# Patient Record
Sex: MALE | Race: WHITE | URBAN - METROPOLITAN AREA
[De-identification: names, ages, dates, MRNs, and addresses within clinical notes are randomized per-mention and may not be internally consistent; named-entity substitution may affect disease eponyms.]

---

## 2017-02-04 ENCOUNTER — EMERGENCY (EMERGENCY)
Facility: HOSPITAL | Age: 27
LOS: 1 days | Discharge: HOME | End: 2017-02-04
Attending: EMERGENCY MEDICINE | Admitting: EMERGENCY MEDICINE
Payer: COMMERCIAL

## 2017-02-04 VITALS
OXYGEN SATURATION: 95 % | WEIGHT: 225.09 LBS | TEMPERATURE: 102 F | HEART RATE: 124 BPM | SYSTOLIC BLOOD PRESSURE: 116 MMHG | HEIGHT: 75 IN | RESPIRATION RATE: 16 BRPM | DIASTOLIC BLOOD PRESSURE: 68 MMHG

## 2017-02-04 DIAGNOSIS — J02.0 STREPTOCOCCAL PHARYNGITIS: ICD-10-CM

## 2017-02-04 DIAGNOSIS — R51 HEADACHE: ICD-10-CM

## 2017-02-04 LAB
ALBUMIN SERPL ELPH-MCNC: 4.6 G/DL — SIGNIFICANT CHANGE UP (ref 3.3–5)
ALP SERPL-CCNC: 58 U/L — SIGNIFICANT CHANGE UP (ref 40–120)
ALT FLD-CCNC: 35 U/L RC — SIGNIFICANT CHANGE UP (ref 10–45)
ANION GAP SERPL CALC-SCNC: 16 MMOL/L — SIGNIFICANT CHANGE UP (ref 5–17)
APPEARANCE UR: CLEAR — SIGNIFICANT CHANGE UP
AST SERPL-CCNC: 36 U/L — SIGNIFICANT CHANGE UP (ref 10–40)
BASOPHILS # BLD AUTO: 0 K/UL — SIGNIFICANT CHANGE UP (ref 0–0.2)
BASOPHILS NFR BLD AUTO: 0.1 % — SIGNIFICANT CHANGE UP (ref 0–2)
BILIRUB SERPL-MCNC: 0.4 MG/DL — SIGNIFICANT CHANGE UP (ref 0.2–1.2)
BILIRUB UR-MCNC: NEGATIVE — SIGNIFICANT CHANGE UP
BUN SERPL-MCNC: 15 MG/DL — SIGNIFICANT CHANGE UP (ref 7–23)
CALCIUM SERPL-MCNC: 9.6 MG/DL — SIGNIFICANT CHANGE UP (ref 8.4–10.5)
CHLORIDE SERPL-SCNC: 98 MMOL/L — SIGNIFICANT CHANGE UP (ref 96–108)
CO2 SERPL-SCNC: 24 MMOL/L — SIGNIFICANT CHANGE UP (ref 22–31)
COLOR SPEC: SIGNIFICANT CHANGE UP
CREAT SERPL-MCNC: 1.2 MG/DL — SIGNIFICANT CHANGE UP (ref 0.5–1.3)
DIFF PNL FLD: NEGATIVE — SIGNIFICANT CHANGE UP
EOSINOPHIL # BLD AUTO: 0 K/UL — SIGNIFICANT CHANGE UP (ref 0–0.5)
EOSINOPHIL NFR BLD AUTO: 0 % — SIGNIFICANT CHANGE UP (ref 0–6)
GAS PNL BLDV: SIGNIFICANT CHANGE UP
GLUCOSE SERPL-MCNC: 131 MG/DL — HIGH (ref 70–99)
GLUCOSE UR QL: NEGATIVE — SIGNIFICANT CHANGE UP
HCT VFR BLD CALC: 43.4 % — SIGNIFICANT CHANGE UP (ref 39–50)
HGB BLD-MCNC: 14.9 G/DL — SIGNIFICANT CHANGE UP (ref 13–17)
KETONES UR-MCNC: NEGATIVE — SIGNIFICANT CHANGE UP
LEUKOCYTE ESTERASE UR-ACNC: NEGATIVE — SIGNIFICANT CHANGE UP
LYMPHOCYTES # BLD AUTO: 0.8 K/UL — LOW (ref 1–3.3)
LYMPHOCYTES # BLD AUTO: 4.9 % — LOW (ref 13–44)
MCHC RBC-ENTMCNC: 31.1 PG — SIGNIFICANT CHANGE UP (ref 27–34)
MCHC RBC-ENTMCNC: 34.3 GM/DL — SIGNIFICANT CHANGE UP (ref 32–36)
MCV RBC AUTO: 90.5 FL — SIGNIFICANT CHANGE UP (ref 80–100)
MONOCYTES # BLD AUTO: 1.2 K/UL — HIGH (ref 0–0.9)
MONOCYTES NFR BLD AUTO: 7.6 % — SIGNIFICANT CHANGE UP (ref 2–14)
NEUTROPHILS # BLD AUTO: 13.8 K/UL — HIGH (ref 1.8–7.4)
NEUTROPHILS NFR BLD AUTO: 87.4 % — HIGH (ref 43–77)
NITRITE UR-MCNC: NEGATIVE — SIGNIFICANT CHANGE UP
PH UR: 6 — SIGNIFICANT CHANGE UP (ref 4.8–8)
PLATELET # BLD AUTO: 205 K/UL — SIGNIFICANT CHANGE UP (ref 150–400)
POTASSIUM SERPL-MCNC: 4.5 MMOL/L — SIGNIFICANT CHANGE UP (ref 3.5–5.3)
POTASSIUM SERPL-SCNC: 4.5 MMOL/L — SIGNIFICANT CHANGE UP (ref 3.5–5.3)
PROT SERPL-MCNC: 7.5 G/DL — SIGNIFICANT CHANGE UP (ref 6–8.3)
PROT UR-MCNC: NEGATIVE — SIGNIFICANT CHANGE UP
RAPID RVP RESULT: SIGNIFICANT CHANGE UP
RBC # BLD: 4.8 M/UL — SIGNIFICANT CHANGE UP (ref 4.2–5.8)
RBC # FLD: 11.7 % — SIGNIFICANT CHANGE UP (ref 10.3–14.5)
RBC CASTS # UR COMP ASSIST: SIGNIFICANT CHANGE UP /HPF (ref 0–2)
S PYO AG SPEC QL IA: POSITIVE
SODIUM SERPL-SCNC: 138 MMOL/L — SIGNIFICANT CHANGE UP (ref 135–145)
SP GR SPEC: 1.02 — SIGNIFICANT CHANGE UP (ref 1.01–1.02)
UROBILINOGEN FLD QL: NEGATIVE — SIGNIFICANT CHANGE UP
WBC # BLD: 15.7 K/UL — HIGH (ref 3.8–10.5)
WBC # FLD AUTO: 15.7 K/UL — HIGH (ref 3.8–10.5)
WBC UR QL: SIGNIFICANT CHANGE UP /HPF (ref 0–5)

## 2017-02-04 PROCEDURE — 99219: CPT | Mod: 25

## 2017-02-04 PROCEDURE — 93010 ELECTROCARDIOGRAM REPORT: CPT

## 2017-02-04 PROCEDURE — 71020: CPT | Mod: 26

## 2017-02-04 RX ORDER — ONDANSETRON 8 MG/1
4 TABLET, FILM COATED ORAL ONCE
Qty: 0 | Refills: 0 | Status: COMPLETED | OUTPATIENT
Start: 2017-02-04 | End: 2017-02-04

## 2017-02-04 RX ORDER — DM/PSEUDOEPHED/ACETAMINOPHEN 15-30-325
0 CAPSULE ORAL
Qty: 0 | Refills: 0 | COMMUNITY

## 2017-02-04 RX ORDER — SODIUM CHLORIDE 9 MG/ML
2000 INJECTION INTRAMUSCULAR; INTRAVENOUS; SUBCUTANEOUS ONCE
Qty: 0 | Refills: 0 | Status: COMPLETED | OUTPATIENT
Start: 2017-02-04 | End: 2017-02-04

## 2017-02-04 RX ORDER — ACETAMINOPHEN 500 MG
1000 TABLET ORAL ONCE
Qty: 0 | Refills: 0 | Status: COMPLETED | OUTPATIENT
Start: 2017-02-04 | End: 2017-02-04

## 2017-02-04 RX ORDER — KETOROLAC TROMETHAMINE 30 MG/ML
30 SYRINGE (ML) INJECTION ONCE
Qty: 0 | Refills: 0 | Status: DISCONTINUED | OUTPATIENT
Start: 2017-02-04 | End: 2017-02-04

## 2017-02-04 RX ORDER — AMOXICILLIN 250 MG/5ML
500 SUSPENSION, RECONSTITUTED, ORAL (ML) ORAL ONCE
Qty: 0 | Refills: 0 | Status: COMPLETED | OUTPATIENT
Start: 2017-02-04 | End: 2017-02-04

## 2017-02-04 RX ORDER — SODIUM CHLORIDE 9 MG/ML
1000 INJECTION INTRAMUSCULAR; INTRAVENOUS; SUBCUTANEOUS ONCE
Qty: 0 | Refills: 0 | Status: COMPLETED | OUTPATIENT
Start: 2017-02-04 | End: 2017-02-04

## 2017-02-04 RX ORDER — AMOXICILLIN 250 MG/5ML
1 SUSPENSION, RECONSTITUTED, ORAL (ML) ORAL
Qty: 20 | Refills: 0 | OUTPATIENT
Start: 2017-02-04 | End: 2017-02-14

## 2017-02-04 RX ADMIN — Medication 30 MILLIGRAM(S): at 21:52

## 2017-02-04 RX ADMIN — SODIUM CHLORIDE 2000 MILLILITER(S): 9 INJECTION INTRAMUSCULAR; INTRAVENOUS; SUBCUTANEOUS at 21:52

## 2017-02-04 RX ADMIN — Medication 500 MILLIGRAM(S): at 21:52

## 2017-02-04 RX ADMIN — Medication 400 MILLIGRAM(S): at 18:10

## 2017-02-04 RX ADMIN — SODIUM CHLORIDE 1000 MILLILITER(S): 9 INJECTION INTRAMUSCULAR; INTRAVENOUS; SUBCUTANEOUS at 18:02

## 2017-02-04 RX ADMIN — ONDANSETRON 4 MILLIGRAM(S): 8 TABLET, FILM COATED ORAL at 21:52

## 2017-02-04 NOTE — ED PROVIDER NOTE - ATTENDING CONTRIBUTION TO CARE
27 y/o m with pmhx denies presents for fever, chills, diffuse body aches, nausea/vomiting, dry cough, sore throat and headache x 1 day. Here with parents at the bedside. Denies sick contacts or known travel. Only took Dayquil at 1pm. No abdominal pain, diarrhea, rash, neck pain/stiffness. Did not get flu shot this year.  Gen. no acute distress, Non toxic   HEENT: EOMI, mmm,   Lungs: CTAB/L no C/ W /R   CVS: S1S2 tachycardia    Abd; Soft non tender, non distended   Ext: no edema   Neuro AAOx3 non focal clear speech 27 y/o m with pmhx denies presents for fever, chills, diffuse body aches, nausea/vomiting, dry cough, sore throat and headache x 1 day. Here with parents at the bedside. Denies sick contacts or known travel. Only took Dayquil at 1pm. No abdominal pain, diarrhea, rash, neck pain/stiffness. Did not get flu shot this year.  Gen. no acute distress, Non toxic   HEENT: EOMI, mmm, + tonsilar hypertrophy on right side. no exudate. no trismus no drooling no tripoding. no lymphadenopathy.   Lungs: CTAB/L no C/ W /R   CVS: S1S2 tachycardia    Abd; Soft non tender, non distended   Ext: no edema   Neuro AAOx3 non focal clear speech

## 2017-02-04 NOTE — ED PROVIDER NOTE - OBJECTIVE STATEMENT
26yM presents with fever, chills, diffuse body aches, nausea/vomiting, dry cough, sore throat and headache x 1 day. Denies sick contacts or known travel. Took Dayquil at 1pm. No abdominal pain, diarrhea, rash, neck pain/stiffness. Did not get flu shot this year.

## 2017-02-04 NOTE — ED ADULT NURSE NOTE - OBJECTIVE STATEMENT
27yo m a&ox4 ambulated into ED c/o fevers, chills, sore throat and headache at home x few days, worsening today. pt denies sick contacts, denies recent travel, denies receiving flu shot this year. pt febrile and tachycardic in ED, CODE SEPSIS called upon assessment in ED. pt denies pain with urination, denies sob, denies cp.

## 2017-02-04 NOTE — ED PROVIDER NOTE - MEDICAL DECISION MAKING DETAILS
ATTD: concern for sig infection / sepsis, r/o pna / uti, check labs, check urine, ivf, check xray chest, re eval for dispo.

## 2017-02-05 VITALS
HEART RATE: 114 BPM | OXYGEN SATURATION: 95 % | RESPIRATION RATE: 17 BRPM | DIASTOLIC BLOOD PRESSURE: 50 MMHG | TEMPERATURE: 100 F | SYSTOLIC BLOOD PRESSURE: 98 MMHG

## 2017-02-05 PROCEDURE — 85014 HEMATOCRIT: CPT

## 2017-02-05 PROCEDURE — 87581 M.PNEUMON DNA AMP PROBE: CPT

## 2017-02-05 PROCEDURE — 84295 ASSAY OF SERUM SODIUM: CPT

## 2017-02-05 PROCEDURE — 85027 COMPLETE CBC AUTOMATED: CPT

## 2017-02-05 PROCEDURE — 71046 X-RAY EXAM CHEST 2 VIEWS: CPT

## 2017-02-05 PROCEDURE — 81001 URINALYSIS AUTO W/SCOPE: CPT

## 2017-02-05 PROCEDURE — 83605 ASSAY OF LACTIC ACID: CPT

## 2017-02-05 PROCEDURE — 96374 THER/PROPH/DIAG INJ IV PUSH: CPT

## 2017-02-05 PROCEDURE — 82330 ASSAY OF CALCIUM: CPT

## 2017-02-05 PROCEDURE — 87798 DETECT AGENT NOS DNA AMP: CPT

## 2017-02-05 PROCEDURE — 82803 BLOOD GASES ANY COMBINATION: CPT

## 2017-02-05 PROCEDURE — 99284 EMERGENCY DEPT VISIT MOD MDM: CPT | Mod: 25

## 2017-02-05 PROCEDURE — 96376 TX/PRO/DX INJ SAME DRUG ADON: CPT

## 2017-02-05 PROCEDURE — 93005 ELECTROCARDIOGRAM TRACING: CPT

## 2017-02-05 PROCEDURE — 80053 COMPREHEN METABOLIC PANEL: CPT

## 2017-02-05 PROCEDURE — 82947 ASSAY GLUCOSE BLOOD QUANT: CPT

## 2017-02-05 PROCEDURE — 96375 TX/PRO/DX INJ NEW DRUG ADDON: CPT

## 2017-02-05 PROCEDURE — G0378: CPT

## 2017-02-05 PROCEDURE — 87880 STREP A ASSAY W/OPTIC: CPT

## 2017-02-05 PROCEDURE — 84132 ASSAY OF SERUM POTASSIUM: CPT

## 2017-02-05 PROCEDURE — 86308 HETEROPHILE ANTIBODY SCREEN: CPT

## 2017-02-05 PROCEDURE — 87486 CHLMYD PNEUM DNA AMP PROBE: CPT

## 2017-02-05 PROCEDURE — 87633 RESP VIRUS 12-25 TARGETS: CPT

## 2017-02-05 PROCEDURE — 82435 ASSAY OF BLOOD CHLORIDE: CPT

## 2017-02-05 PROCEDURE — 82565 ASSAY OF CREATININE: CPT

## 2017-02-05 RX ORDER — SODIUM CHLORIDE 9 MG/ML
1000 INJECTION INTRAMUSCULAR; INTRAVENOUS; SUBCUTANEOUS
Qty: 0 | Refills: 0 | Status: DISCONTINUED | OUTPATIENT
Start: 2017-02-05 | End: 2017-02-08

## 2017-02-05 RX ORDER — SODIUM CHLORIDE 9 MG/ML
1000 INJECTION INTRAMUSCULAR; INTRAVENOUS; SUBCUTANEOUS ONCE
Qty: 0 | Refills: 0 | Status: COMPLETED | OUTPATIENT
Start: 2017-02-05 | End: 2017-02-05

## 2017-02-05 RX ORDER — DEXAMETHASONE 0.5 MG/5ML
5 ELIXIR ORAL ONCE
Qty: 0 | Refills: 0 | Status: COMPLETED | OUTPATIENT
Start: 2017-02-05 | End: 2017-02-05

## 2017-02-05 RX ORDER — ACETAMINOPHEN 500 MG
1000 TABLET ORAL ONCE
Qty: 0 | Refills: 0 | Status: COMPLETED | OUTPATIENT
Start: 2017-02-05 | End: 2017-02-05

## 2017-02-05 RX ORDER — KETOROLAC TROMETHAMINE 30 MG/ML
30 SYRINGE (ML) INJECTION ONCE
Qty: 0 | Refills: 0 | Status: DISCONTINUED | OUTPATIENT
Start: 2017-02-05 | End: 2017-02-05

## 2017-02-05 RX ORDER — AMOXICILLIN 250 MG/5ML
500 SUSPENSION, RECONSTITUTED, ORAL (ML) ORAL EVERY 12 HOURS
Qty: 0 | Refills: 0 | Status: DISCONTINUED | OUTPATIENT
Start: 2017-02-05 | End: 2017-02-08

## 2017-02-05 RX ADMIN — SODIUM CHLORIDE 2000 MILLILITER(S): 9 INJECTION INTRAMUSCULAR; INTRAVENOUS; SUBCUTANEOUS at 09:24

## 2017-02-05 RX ADMIN — Medication 400 MILLIGRAM(S): at 02:12

## 2017-02-05 RX ADMIN — Medication 30 MILLIGRAM(S): at 07:27

## 2017-02-05 RX ADMIN — Medication 30 MILLIGRAM(S): at 01:45

## 2017-02-05 RX ADMIN — SODIUM CHLORIDE 150 MILLILITER(S): 9 INJECTION INTRAMUSCULAR; INTRAVENOUS; SUBCUTANEOUS at 02:12

## 2017-02-05 RX ADMIN — Medication 5 MILLIGRAM(S): at 07:27

## 2017-02-05 RX ADMIN — SODIUM CHLORIDE 150 MILLILITER(S): 9 INJECTION INTRAMUSCULAR; INTRAVENOUS; SUBCUTANEOUS at 09:26

## 2017-02-05 RX ADMIN — Medication 1000 MILLIGRAM(S): at 05:03

## 2017-02-05 RX ADMIN — Medication 500 MILLIGRAM(S): at 09:24

## 2017-02-05 NOTE — ED CDU PROVIDER NOTE - PLAN OF CARE
1. Stay well hydrated. Take Motrin 600mg every 8 hours or Tylenol 650mg every 6 hours as needed for pain/fever, take with food. Warm salt water gargles 3-4xday in the day. Soft diet. Take Amoxicillin 500mg twice daily for 10 days.   2. Follow up with your primary care provider Dr. Alvarado in 24-48 hours.   3. Return to ED for persistent high fever, vomiting, unable to swallow, neck pain or any other concerns. 1. Stay well hydrated. Take Motrin 600mg every 8 hours or Tylenol 650mg every 6 hours as needed for pain/fever, take with food. Warm salt water gargles 3-4xday. Soft diet. Take Amoxicillin 500mg twice daily for 10 days.   2. Follow up with your primary care provider Dr. Alvarado in 24-48 hours.   3. Return to ED for persistent high fever, vomiting, unable to swallow, neck pain, difficulty breathing or any other concerns.

## 2017-02-05 NOTE — ED CDU PROVIDER NOTE - DETAILS
Strep Pharyngitis/Tachycardia  - IVF  - Pain/fever control  - PO challenge  - ana laura peguero  Case d/w Dr. Pascual

## 2017-02-05 NOTE — ED CDU PROVIDER NOTE - ATTENDING CONTRIBUTION TO CARE
25 y/o m with pmhx denies presents for fever, chills, diffuse body aches, nausea/vomiting, dry cough, sore throat and headache x 1 day. Here with parents at the bedside. Denies sick contacts or known travel. Only took Dayquil at 1pm. No abdominal pain, diarrhea, rash, neck pain/stiffness. Did not get flu shot this year.  Gen. no acute distress, Non toxic   HEENT: EOMI, mmm, uvula midline, no exudate, + tonsilar hypertrophy on right side. patent airway. no lymphadenopathy.  no trismus no drooling  Lungs: CTAB/L no C/ W /R   CVS: S1S2 tachycardia    Abd; Soft non tender, non distended   Ext: no edema   Neuro AAOx3 non focal clear speech

## 2017-02-05 NOTE — ED CDU PROVIDER NOTE - OBJECTIVE STATEMENT
26yM no PMH presents with fever, chills, diffuse body aches, nausea/vomiting, dry cough, sore throat and headache x 1 day. Denies sick contacts or known travel. Took Dayquil at 1pm. No abdominal pain, diarrhea, rash, neck pain/stiffness. Did not get flu shot this year.  In ED pt code sepsis - tachy to 124 and febrile to 102.8F. Rapid strep positive, given amox 500mg PO. Pt persistently tachy after 3L IVF. Will admit to CDU for IVF, pain/fever control, PO chal, and re-eval.    PCP Nitin Alvarado

## 2017-02-05 NOTE — ED ADULT NURSE REASSESSMENT NOTE - NS ED NURSE REASSESS COMMENT FT1
Pt being transferred to CDU for fluids and observation.
Pt resting, eating and drinking, dispo pending PO challenge and revitals.
Received pt from [facility]  from ER , received pt alert and responsive, oriented x4, denies any respiratory distress, SOB, or difficulty breathing. Pt transferred to CDU for observation for fever/chills , IV in place, patent and free of signs of infiltration,  pt c/o chills and general body aches. Temperature 100.2, HR episodes of tachycardia. Tylenol IV and Fluids currently infusing. Awaiting Antibiotic treatment as ordered. Pt educated on unit and unit rules, instructed patient to notify RN of any needed assistance, Pt verbalizes understanding, Call bell placed within reach. Safety maintained. Will continue to monitor.

## 2017-02-05 NOTE — ED CDU PROVIDER NOTE - PROGRESS NOTE DETAILS
Patient resting in bed comfortably. No distress, no complaints. Vital Signs Stable.  -Estela Negrete PA-C Pt feeling better, still with sore throat.  Fever improved after Toradol.  Will plan for PO challenge and d/c home follow up ENT.  BS ED attending Dr Shawn Mccullough note:  Patient re-evaluated and doing well.  No acute issues at  this time.  Lab and radiology tests reviewed with patient and/or family.  Patient stable for discharge.  I have personally performed a face to face diagnostic evaluation on this patient.  I have reviewed the ACP note and agree with the history, exam, and plan of care, except as noted.  History and Exam by me show  tonsillar exudates injection, midline, vss ready for d.c home with course of abx, ent follow up.

## 2017-02-06 LAB — HETEROPH AB TITR SER AGGL: NEGATIVE — SIGNIFICANT CHANGE UP

## 2017-02-21 ENCOUNTER — APPOINTMENT (OUTPATIENT)
Dept: OTOLARYNGOLOGY | Facility: CLINIC | Age: 27
End: 2017-02-21

## 2017-02-28 DIAGNOSIS — Z82.49 FAMILY HISTORY OF ISCHEMIC HEART DISEASE AND OTHER DISEASES OF THE CIRCULATORY SYSTEM: ICD-10-CM

## 2017-02-28 DIAGNOSIS — Z78.9 OTHER SPECIFIED HEALTH STATUS: ICD-10-CM

## 2017-02-28 RX ORDER — AMOXICILLIN AND CLAVULANATE POTASSIUM 875; 125 MG/1; MG/1
875-125 TABLET, COATED ORAL
Qty: 20 | Refills: 0 | Status: ACTIVE | COMMUNITY
Start: 2017-02-10

## 2017-02-28 RX ORDER — AMOXICILLIN 500 MG/1
500 CAPSULE ORAL
Qty: 20 | Refills: 0 | Status: ACTIVE | COMMUNITY
Start: 2017-02-04

## 2017-02-28 RX ORDER — OMEPRAZOLE 40 MG/1
40 CAPSULE, DELAYED RELEASE ORAL
Qty: 30 | Refills: 0 | Status: ACTIVE | COMMUNITY
Start: 2017-01-05

## 2017-02-28 RX ORDER — METHYLPREDNISOLONE 4 MG/1
4 TABLET ORAL
Qty: 21 | Refills: 0 | Status: ACTIVE | COMMUNITY
Start: 2017-02-05

## 2017-03-01 ENCOUNTER — RESULT REVIEW (OUTPATIENT)
Age: 27
End: 2017-03-01

## 2017-03-01 RX ORDER — METHYLPREDNISOLONE 4 MG/1
4 TABLET ORAL
Qty: 1 | Refills: 0 | Status: ACTIVE | COMMUNITY
Start: 2017-03-01 | End: 1900-01-01

## 2017-03-01 RX ORDER — LIDOCAINE HYDROCHLORIDE 20 MG/ML
2 SOLUTION OROPHARYNGEAL
Qty: 1 | Refills: 3 | Status: ACTIVE | COMMUNITY
Start: 2017-03-01 | End: 1900-01-01

## 2017-03-01 RX ORDER — DOCUSATE SODIUM 100 MG/1
100 CAPSULE ORAL
Qty: 1 | Refills: 0 | Status: ACTIVE | COMMUNITY
Start: 2017-03-01 | End: 1900-01-01

## 2017-03-02 ENCOUNTER — OUTPATIENT (OUTPATIENT)
Dept: OUTPATIENT SERVICES | Facility: HOSPITAL | Age: 27
LOS: 1 days | Discharge: HOME | End: 2017-03-02
Payer: COMMERCIAL

## 2017-03-02 ENCOUNTER — APPOINTMENT (OUTPATIENT)
Dept: OTOLARYNGOLOGY | Facility: AMBULATORY SURGERY CENTER | Age: 27
End: 2017-03-02

## 2017-03-02 PROCEDURE — 42870 EXCISION OF LINGUAL TONSIL: CPT

## 2017-03-04 LAB — SURGICAL PATHOLOGY STUDY: SIGNIFICANT CHANGE UP

## 2017-03-06 DIAGNOSIS — K21.9 GASTRO-ESOPHAGEAL REFLUX DISEASE WITHOUT ESOPHAGITIS: ICD-10-CM

## 2017-03-06 DIAGNOSIS — J35.01 CHRONIC TONSILLITIS: ICD-10-CM

## 2017-03-09 ENCOUNTER — APPOINTMENT (OUTPATIENT)
Dept: OTOLARYNGOLOGY | Facility: CLINIC | Age: 27
End: 2017-03-09

## 2017-03-09 VITALS
TEMPERATURE: 98.6 F | DIASTOLIC BLOOD PRESSURE: 83 MMHG | OXYGEN SATURATION: 97 % | SYSTOLIC BLOOD PRESSURE: 119 MMHG | HEART RATE: 91 BPM

## 2017-03-09 DIAGNOSIS — J02.0 STREPTOCOCCAL PHARYNGITIS: ICD-10-CM

## 2017-03-09 DIAGNOSIS — Z87.09 PERSONAL HISTORY OF OTHER DISEASES OF THE RESPIRATORY SYSTEM: ICD-10-CM

## 2017-03-09 RX ORDER — AMOXICILLIN 500 MG/1
500 TABLET, FILM COATED ORAL
Qty: 14 | Refills: 0 | Status: COMPLETED | COMMUNITY
Start: 2017-03-01 | End: 2017-03-09

## 2017-03-09 RX ORDER — ACETAMINOPHEN AND CODEINE 300; 30 MG/1; MG/1
300-30 TABLET ORAL
Qty: 30 | Refills: 0 | Status: COMPLETED | COMMUNITY
Start: 2017-03-01 | End: 2017-03-09

## 2017-06-07 ENCOUNTER — TRANSCRIPTION ENCOUNTER (OUTPATIENT)
Age: 27
End: 2017-06-07

## 2018-11-14 ENCOUNTER — EMERGENCY (EMERGENCY)
Facility: HOSPITAL | Age: 28
LOS: 1 days | Discharge: HOME | End: 2018-11-14
Attending: EMERGENCY MEDICINE
Payer: COMMERCIAL

## 2018-11-14 VITALS
OXYGEN SATURATION: 98 % | RESPIRATION RATE: 20 BRPM | WEIGHT: 259.93 LBS | TEMPERATURE: 98 F | HEART RATE: 92 BPM | HEIGHT: 75 IN | SYSTOLIC BLOOD PRESSURE: 124 MMHG | DIASTOLIC BLOOD PRESSURE: 86 MMHG

## 2018-11-14 VITALS
TEMPERATURE: 98 F | OXYGEN SATURATION: 98 % | SYSTOLIC BLOOD PRESSURE: 128 MMHG | HEART RATE: 72 BPM | DIASTOLIC BLOOD PRESSURE: 64 MMHG | RESPIRATION RATE: 18 BRPM

## 2018-11-14 LAB
BASOPHILS # BLD AUTO: 0 K/UL — SIGNIFICANT CHANGE UP (ref 0–0.2)
BASOPHILS NFR BLD AUTO: 1 % — SIGNIFICANT CHANGE UP (ref 0–2)
EOSINOPHIL # BLD AUTO: 0.2 K/UL — SIGNIFICANT CHANGE UP (ref 0–0.5)
EOSINOPHIL NFR BLD AUTO: 3 % — SIGNIFICANT CHANGE UP (ref 0–6)
HCT VFR BLD CALC: 48.9 % — SIGNIFICANT CHANGE UP (ref 39–50)
HGB BLD-MCNC: 16 G/DL — SIGNIFICANT CHANGE UP (ref 13–17)
LYMPHOCYTES # BLD AUTO: 1.9 K/UL — SIGNIFICANT CHANGE UP (ref 1–3.3)
LYMPHOCYTES # BLD AUTO: 26 % — SIGNIFICANT CHANGE UP (ref 13–44)
MAGNESIUM SERPL-MCNC: 2.1 MG/DL — SIGNIFICANT CHANGE UP (ref 1.6–2.6)
MCHC RBC-ENTMCNC: 29.1 PG — SIGNIFICANT CHANGE UP (ref 27–34)
MCHC RBC-ENTMCNC: 32.8 GM/DL — SIGNIFICANT CHANGE UP (ref 32–36)
MCV RBC AUTO: 88.6 FL — SIGNIFICANT CHANGE UP (ref 80–100)
MONOCYTES # BLD AUTO: 0.9 K/UL — SIGNIFICANT CHANGE UP (ref 0–0.9)
MONOCYTES NFR BLD AUTO: 17 % — HIGH (ref 2–14)
NEUTROPHILS # BLD AUTO: 2 K/UL — SIGNIFICANT CHANGE UP (ref 1.8–7.4)
NEUTROPHILS NFR BLD AUTO: 41 % — LOW (ref 43–77)
NEUTS BAND # BLD: 4 % — SIGNIFICANT CHANGE UP (ref 0–8)
PHOSPHATE SERPL-MCNC: 3.6 MG/DL — SIGNIFICANT CHANGE UP (ref 2.5–4.5)
PLAT MORPH BLD: NORMAL — SIGNIFICANT CHANGE UP
PLATELET # BLD AUTO: 238 K/UL — SIGNIFICANT CHANGE UP (ref 150–400)
RBC # BLD: 5.51 M/UL — SIGNIFICANT CHANGE UP (ref 4.2–5.8)
RBC # FLD: 11.3 % — SIGNIFICANT CHANGE UP (ref 10.3–14.5)
RBC BLD AUTO: NORMAL — SIGNIFICANT CHANGE UP
VARIANT LYMPHS # BLD: 8 % — HIGH (ref 0–6)
WBC # BLD: 4.9 K/UL — SIGNIFICANT CHANGE UP (ref 3.8–10.5)
WBC # FLD AUTO: 4.9 K/UL — SIGNIFICANT CHANGE UP (ref 3.8–10.5)

## 2018-11-14 PROCEDURE — 85027 COMPLETE CBC AUTOMATED: CPT

## 2018-11-14 PROCEDURE — 83735 ASSAY OF MAGNESIUM: CPT

## 2018-11-14 PROCEDURE — 84100 ASSAY OF PHOSPHORUS: CPT

## 2018-11-14 PROCEDURE — 36415 COLL VENOUS BLD VENIPUNCTURE: CPT

## 2018-11-14 PROCEDURE — 99283 EMERGENCY DEPT VISIT LOW MDM: CPT | Mod: 25

## 2018-11-14 PROCEDURE — 99284 EMERGENCY DEPT VISIT MOD MDM: CPT

## 2018-11-14 RX ORDER — MECLIZINE HCL 12.5 MG
25 TABLET ORAL ONCE
Qty: 0 | Refills: 0 | Status: COMPLETED | OUTPATIENT
Start: 2018-11-14 | End: 2018-11-14

## 2018-11-14 RX ORDER — SODIUM CHLORIDE 9 MG/ML
1000 INJECTION INTRAMUSCULAR; INTRAVENOUS; SUBCUTANEOUS ONCE
Qty: 0 | Refills: 0 | Status: COMPLETED | OUTPATIENT
Start: 2018-11-14 | End: 2018-11-14

## 2018-11-14 RX ORDER — ACETAMINOPHEN 500 MG
975 TABLET ORAL ONCE
Qty: 0 | Refills: 0 | Status: DISCONTINUED | OUTPATIENT
Start: 2018-11-14 | End: 2018-11-18

## 2018-11-14 RX ORDER — MECLIZINE HCL 12.5 MG
1 TABLET ORAL
Qty: 15 | Refills: 0 | OUTPATIENT
Start: 2018-11-14 | End: 2018-11-18

## 2018-11-14 RX ADMIN — SODIUM CHLORIDE 2000 MILLILITER(S): 9 INJECTION INTRAMUSCULAR; INTRAVENOUS; SUBCUTANEOUS at 09:11

## 2018-11-14 RX ADMIN — Medication 25 MILLIGRAM(S): at 09:11

## 2018-11-14 NOTE — ED PROVIDER NOTE - PHYSICAL EXAMINATION
CONSTITUTIONAL: Patient is awake, alert and oriented x 3. Patient is well appearing and in no acute distress.  HEAD: NCAT,   EYES: PERRL b/l, EOMI, No horizontal or vertical nystagmus   ENT: Airway patent, Nasal mucosa clear. Mouth with normal mucosa. Throat has no vesicles, no oropharyngeal exudates and uvula is midline.  NECK: supple, No LAD,  LUNGS: CTA B/L,  HEART: RRR.+S1S2 no murmurs,   ABDOMEN: Soft nd/nt+bs no rebound or guarding.   EXTREMITY: no edema or calf tenderness b/l, FROM upper and lower ext b/l  SKIN: with no rash or lesions.   NEURO: Cn3-12 grossly intact. Strength5/5UE/LE.NmlSensation.Gait normal.

## 2018-11-14 NOTE — ED ADULT NURSE NOTE - NSIMPLEMENTINTERV_GEN_ALL_ED
Implemented All Universal Safety Interventions:  [place] to call system. Call bell, personal items and telephone within reach. Instruct patient to call for assistance. Room bathroom lighting operational. Non-slip footwear when patient is off stretcher. Physically safe environment: no spills, clutter or unnecessary equipment. Stretcher in lowest position, wheels locked, appropriate side rails in place.

## 2018-11-14 NOTE — ED ADULT NURSE NOTE - FAMILY HISTORY
Father  Still living? Yes, Estimated age: Age Unknown  Family history of essential hypertension, Age at diagnosis: Age Unknown  Family history of kidney stones, Age at diagnosis: Age Unknown

## 2018-11-14 NOTE — ED ADULT NURSE NOTE - OBJECTIVE STATEMENT
27 year old male presents to the ED ambulatory through waiting room with parents complaining of dizziness x 1 month. No significant PMH. Patient reports daily intermittent sensation of room spinning which is worsened with laying down. States he has also experienced intermittent ha, nausea and ear pressure. Was seen at urgent care 3 weeks ago and this morning regarding symptoms and started on amoxicillin and ear drops which he stopped after 2 days after "googling symptoms on the computer" and diagnosing self with vertigo. Pt. states has had continued symptoms since. Patient had appointment w/ ENT today, but came to ED for recurrent dizziness this am. Denies fevers, chills, chest pain, sob, abdominal pain, vomiting, urinary symptoms, visual changes, weakness, numbness. 20g peripheral IV placed in R ac and labs drawn and sent to lab. Patient undressed and placed into gown and side rails up with bed in lowest position for safety. blanket provided. Comfort and safety provided.

## 2018-11-14 NOTE — ED PROVIDER NOTE - OBJECTIVE STATEMENT
27 year old male w/ no sig pmhx presents c/o dizziness x 1 month. Patient reports daily intermittent sensation of room spinning which is worsened with laying down. States he has also experienced intermittent ha, nausea and ear pressure. Was seen at urgent care regarding symptoms and started on amoxicillin and ear drops which he stopped after 2 days after googling on the computer and diagnosing self with vertigo. States has had continued symptoms since. Has appointment w/ ENT but came to ED for recurrent dizziness this am. Denies fevers, chills, chest pain, sob, abdominal pain, vomiting, urinary symptoms, visual changes, weakness, numbness.

## 2020-02-02 ENCOUNTER — TRANSCRIPTION ENCOUNTER (OUTPATIENT)
Age: 30
End: 2020-02-02

## 2020-02-04 ENCOUNTER — TRANSCRIPTION ENCOUNTER (OUTPATIENT)
Age: 30
End: 2020-02-04

## 2020-08-07 ENCOUNTER — EMERGENCY (EMERGENCY)
Facility: HOSPITAL | Age: 30
LOS: 0 days | Discharge: HOME | End: 2020-08-07
Attending: EMERGENCY MEDICINE
Payer: COMMERCIAL

## 2020-08-07 VITALS
TEMPERATURE: 98 F | DIASTOLIC BLOOD PRESSURE: 66 MMHG | HEART RATE: 86 BPM | OXYGEN SATURATION: 97 % | SYSTOLIC BLOOD PRESSURE: 109 MMHG | RESPIRATION RATE: 16 BRPM

## 2020-08-07 VITALS — WEIGHT: 270.07 LBS | HEIGHT: 75 IN

## 2020-08-07 DIAGNOSIS — K50.00 CROHN'S DISEASE OF SMALL INTESTINE WITHOUT COMPLICATIONS: ICD-10-CM

## 2020-08-07 DIAGNOSIS — R10.30 LOWER ABDOMINAL PAIN, UNSPECIFIED: ICD-10-CM

## 2020-08-07 DIAGNOSIS — R50.9 FEVER, UNSPECIFIED: ICD-10-CM

## 2020-08-07 DIAGNOSIS — R11.0 NAUSEA: ICD-10-CM

## 2020-08-07 LAB
ALBUMIN SERPL ELPH-MCNC: 4.1 G/DL — SIGNIFICANT CHANGE UP (ref 3.3–5)
ALP SERPL-CCNC: 67 U/L — SIGNIFICANT CHANGE UP (ref 40–120)
ALT FLD-CCNC: 105 U/L — HIGH (ref 12–78)
ANION GAP SERPL CALC-SCNC: 8 MMOL/L — SIGNIFICANT CHANGE UP (ref 5–17)
APPEARANCE UR: CLEAR — SIGNIFICANT CHANGE UP
AST SERPL-CCNC: 34 U/L — SIGNIFICANT CHANGE UP (ref 15–37)
BASOPHILS # BLD AUTO: 0.08 K/UL — SIGNIFICANT CHANGE UP (ref 0–0.2)
BASOPHILS NFR BLD AUTO: 0.6 % — SIGNIFICANT CHANGE UP (ref 0–2)
BILIRUB SERPL-MCNC: 0.5 MG/DL — SIGNIFICANT CHANGE UP (ref 0.2–1.2)
BILIRUB UR-MCNC: NEGATIVE — SIGNIFICANT CHANGE UP
BUN SERPL-MCNC: 15 MG/DL — SIGNIFICANT CHANGE UP (ref 7–23)
CALCIUM SERPL-MCNC: 9.1 MG/DL — SIGNIFICANT CHANGE UP (ref 8.5–10.1)
CHLORIDE SERPL-SCNC: 106 MMOL/L — SIGNIFICANT CHANGE UP (ref 96–108)
CO2 SERPL-SCNC: 26 MMOL/L — SIGNIFICANT CHANGE UP (ref 22–31)
COLOR SPEC: YELLOW — SIGNIFICANT CHANGE UP
CREAT SERPL-MCNC: 1.13 MG/DL — SIGNIFICANT CHANGE UP (ref 0.5–1.3)
DIFF PNL FLD: NEGATIVE — SIGNIFICANT CHANGE UP
EOSINOPHIL # BLD AUTO: 0.07 K/UL — SIGNIFICANT CHANGE UP (ref 0–0.5)
EOSINOPHIL NFR BLD AUTO: 0.5 % — SIGNIFICANT CHANGE UP (ref 0–6)
GLUCOSE SERPL-MCNC: 100 MG/DL — HIGH (ref 70–99)
GLUCOSE UR QL: NEGATIVE MG/DL — SIGNIFICANT CHANGE UP
HCT VFR BLD CALC: 45.6 % — SIGNIFICANT CHANGE UP (ref 39–50)
HGB BLD-MCNC: 15.1 G/DL — SIGNIFICANT CHANGE UP (ref 13–17)
IMM GRANULOCYTES NFR BLD AUTO: 0.3 % — SIGNIFICANT CHANGE UP (ref 0–1.5)
KETONES UR-MCNC: NEGATIVE — SIGNIFICANT CHANGE UP
LACTATE SERPL-SCNC: 1.4 MMOL/L — SIGNIFICANT CHANGE UP (ref 0.7–2)
LEUKOCYTE ESTERASE UR-ACNC: NEGATIVE — SIGNIFICANT CHANGE UP
LIDOCAIN IGE QN: 80 U/L — SIGNIFICANT CHANGE UP (ref 73–393)
LYMPHOCYTES # BLD AUTO: 15.9 % — SIGNIFICANT CHANGE UP (ref 13–44)
LYMPHOCYTES # BLD AUTO: 2.07 K/UL — SIGNIFICANT CHANGE UP (ref 1–3.3)
MCHC RBC-ENTMCNC: 29.8 PG — SIGNIFICANT CHANGE UP (ref 27–34)
MCHC RBC-ENTMCNC: 33.1 GM/DL — SIGNIFICANT CHANGE UP (ref 32–36)
MCV RBC AUTO: 89.9 FL — SIGNIFICANT CHANGE UP (ref 80–100)
MONOCYTES # BLD AUTO: 1.22 K/UL — HIGH (ref 0–0.9)
MONOCYTES NFR BLD AUTO: 9.4 % — SIGNIFICANT CHANGE UP (ref 2–14)
NEUTROPHILS # BLD AUTO: 9.5 K/UL — HIGH (ref 1.8–7.4)
NEUTROPHILS NFR BLD AUTO: 73.3 % — SIGNIFICANT CHANGE UP (ref 43–77)
NITRITE UR-MCNC: NEGATIVE — SIGNIFICANT CHANGE UP
PH UR: 6.5 — SIGNIFICANT CHANGE UP (ref 5–8)
PLATELET # BLD AUTO: 246 K/UL — SIGNIFICANT CHANGE UP (ref 150–400)
POTASSIUM SERPL-MCNC: 3.8 MMOL/L — SIGNIFICANT CHANGE UP (ref 3.5–5.3)
POTASSIUM SERPL-SCNC: 3.8 MMOL/L — SIGNIFICANT CHANGE UP (ref 3.5–5.3)
PROT SERPL-MCNC: 7.9 GM/DL — SIGNIFICANT CHANGE UP (ref 6–8.3)
PROT UR-MCNC: 15 MG/DL
RBC # BLD: 5.07 M/UL — SIGNIFICANT CHANGE UP (ref 4.2–5.8)
RBC # FLD: 11.9 % — SIGNIFICANT CHANGE UP (ref 10.3–14.5)
SODIUM SERPL-SCNC: 140 MMOL/L — SIGNIFICANT CHANGE UP (ref 135–145)
SP GR SPEC: 1.01 — SIGNIFICANT CHANGE UP (ref 1.01–1.02)
UROBILINOGEN FLD QL: NEGATIVE MG/DL — SIGNIFICANT CHANGE UP
WBC # BLD: 12.98 K/UL — HIGH (ref 3.8–10.5)
WBC # FLD AUTO: 12.98 K/UL — HIGH (ref 3.8–10.5)

## 2020-08-07 PROCEDURE — 86850 RBC ANTIBODY SCREEN: CPT

## 2020-08-07 PROCEDURE — 80053 COMPREHEN METABOLIC PANEL: CPT

## 2020-08-07 PROCEDURE — 71045 X-RAY EXAM CHEST 1 VIEW: CPT | Mod: 26

## 2020-08-07 PROCEDURE — 96375 TX/PRO/DX INJ NEW DRUG ADDON: CPT

## 2020-08-07 PROCEDURE — 36415 COLL VENOUS BLD VENIPUNCTURE: CPT

## 2020-08-07 PROCEDURE — 87086 URINE CULTURE/COLONY COUNT: CPT

## 2020-08-07 PROCEDURE — 86901 BLOOD TYPING SEROLOGIC RH(D): CPT

## 2020-08-07 PROCEDURE — 96365 THER/PROPH/DIAG IV INF INIT: CPT | Mod: XU

## 2020-08-07 PROCEDURE — 71045 X-RAY EXAM CHEST 1 VIEW: CPT

## 2020-08-07 PROCEDURE — 86900 BLOOD TYPING SEROLOGIC ABO: CPT

## 2020-08-07 PROCEDURE — 85025 COMPLETE CBC W/AUTO DIFF WBC: CPT

## 2020-08-07 PROCEDURE — 96361 HYDRATE IV INFUSION ADD-ON: CPT

## 2020-08-07 PROCEDURE — 93005 ELECTROCARDIOGRAM TRACING: CPT

## 2020-08-07 PROCEDURE — 83605 ASSAY OF LACTIC ACID: CPT

## 2020-08-07 PROCEDURE — 93010 ELECTROCARDIOGRAM REPORT: CPT

## 2020-08-07 PROCEDURE — 81001 URINALYSIS AUTO W/SCOPE: CPT

## 2020-08-07 PROCEDURE — 74177 CT ABD & PELVIS W/CONTRAST: CPT | Mod: 26

## 2020-08-07 PROCEDURE — 99284 EMERGENCY DEPT VISIT MOD MDM: CPT | Mod: 25

## 2020-08-07 PROCEDURE — 74177 CT ABD & PELVIS W/CONTRAST: CPT

## 2020-08-07 PROCEDURE — 83690 ASSAY OF LIPASE: CPT

## 2020-08-07 PROCEDURE — 87040 BLOOD CULTURE FOR BACTERIA: CPT

## 2020-08-07 PROCEDURE — 99284 EMERGENCY DEPT VISIT MOD MDM: CPT

## 2020-08-07 RX ORDER — PIPERACILLIN AND TAZOBACTAM 4; .5 G/20ML; G/20ML
3.38 INJECTION, POWDER, LYOPHILIZED, FOR SOLUTION INTRAVENOUS ONCE
Refills: 0 | Status: COMPLETED | OUTPATIENT
Start: 2020-08-07 | End: 2020-08-07

## 2020-08-07 RX ORDER — MOXIFLOXACIN HYDROCHLORIDE TABLETS, 400 MG 400 MG/1
1 TABLET, FILM COATED ORAL
Qty: 20 | Refills: 0
Start: 2020-08-07 | End: 2020-08-16

## 2020-08-07 RX ORDER — IBUPROFEN 200 MG
1 TABLET ORAL
Qty: 10 | Refills: 0
Start: 2020-08-07

## 2020-08-07 RX ORDER — ONDANSETRON 8 MG/1
4 TABLET, FILM COATED ORAL ONCE
Refills: 0 | Status: COMPLETED | OUTPATIENT
Start: 2020-08-07 | End: 2020-08-07

## 2020-08-07 RX ORDER — SODIUM CHLORIDE 9 MG/ML
2000 INJECTION, SOLUTION INTRAVENOUS ONCE
Refills: 0 | Status: COMPLETED | OUTPATIENT
Start: 2020-08-07 | End: 2020-08-07

## 2020-08-07 RX ORDER — SODIUM CHLORIDE 9 MG/ML
1000 INJECTION, SOLUTION INTRAVENOUS ONCE
Refills: 0 | Status: COMPLETED | OUTPATIENT
Start: 2020-08-07 | End: 2020-08-07

## 2020-08-07 RX ORDER — METRONIDAZOLE 500 MG
1 TABLET ORAL
Qty: 30 | Refills: 0
Start: 2020-08-07 | End: 2020-08-16

## 2020-08-07 RX ORDER — ONDANSETRON 8 MG/1
1 TABLET, FILM COATED ORAL
Qty: 10 | Refills: 0
Start: 2020-08-07

## 2020-08-07 RX ORDER — ACETAMINOPHEN 500 MG
1000 TABLET ORAL ONCE
Refills: 0 | Status: COMPLETED | OUTPATIENT
Start: 2020-08-07 | End: 2020-08-07

## 2020-08-07 RX ADMIN — SODIUM CHLORIDE 1000 MILLILITER(S): 9 INJECTION, SOLUTION INTRAVENOUS at 02:39

## 2020-08-07 RX ADMIN — Medication 1000 MILLIGRAM(S): at 02:11

## 2020-08-07 RX ADMIN — SODIUM CHLORIDE 1000 MILLILITER(S): 9 INJECTION, SOLUTION INTRAVENOUS at 03:39

## 2020-08-07 RX ADMIN — PIPERACILLIN AND TAZOBACTAM 3.38 GRAM(S): 4; .5 INJECTION, POWDER, LYOPHILIZED, FOR SOLUTION INTRAVENOUS at 01:22

## 2020-08-07 RX ADMIN — SODIUM CHLORIDE 2000 MILLILITER(S): 9 INJECTION, SOLUTION INTRAVENOUS at 00:51

## 2020-08-07 RX ADMIN — PIPERACILLIN AND TAZOBACTAM 200 GRAM(S): 4; .5 INJECTION, POWDER, LYOPHILIZED, FOR SOLUTION INTRAVENOUS at 00:52

## 2020-08-07 RX ADMIN — SODIUM CHLORIDE 2000 MILLILITER(S): 9 INJECTION, SOLUTION INTRAVENOUS at 01:51

## 2020-08-07 RX ADMIN — Medication 1000 MILLIGRAM(S): at 01:06

## 2020-08-07 RX ADMIN — ONDANSETRON 4 MILLIGRAM(S): 8 TABLET, FILM COATED ORAL at 01:06

## 2020-08-07 NOTE — ED ADULT TRIAGE NOTE - CHIEF COMPLAINT QUOTE
Ambulatory sent from  to r/o diverticulitis. Patient works for [employer] and worked outside all day to come home with myalgias and LLQ pain that radiated to his flank but he thought it was heat exhaustion. Had one episode of vomiting and then felt better. Woke this morning and has been nauseous since breakfast, had another episode of vomiting and Tmax 102 prior to going to urgent care.

## 2020-08-07 NOTE — ED PROVIDER NOTE - NSFOLLOWUPINSTRUCTIONS_ED_ALL_ED_FT
your meds at [facility].  See a gastroenterologist. Referral given.            ENTERITIS - AfterCare(R) Instructions(ER/ED)     Enteritis    WHAT YOU NEED TO KNOW:    Enteritis is inflammation of the small intestine. It may be caused by eating foods or drinking liquids contaminated with a virus, bacteria, or parasites. It may also be caused by certain medicines, damage from radiation, and medical conditions such as Crohn disease.     DISCHARGE INSTRUCTIONS:    Return to the emergency department if:     You cannot stop vomiting.      You have not urinated for 12 hours.     Contact your healthcare provider if:     You have a fever over 101.5.       You have blood or mucus in your bowel movements.       You continue to vomit or have diarrhea for more than 3 days, even after treatment.      You have a dry mouth and eyes, you are urinating less than usual, and you feel dizzy when you stand up.       Your mouth or eyes are dry. You are not urinating as much or as often.      You are losing weight without trying.      You have questions or concerns about your condition or care.    Medicines:     Medicines may be given to fight an infection caused by bacteria or a parasite. You may also need medicines to slow or stop your diarrhea or vomiting. Do not take these medicines unless your healthcare provider say it is okay. Other medicines may be needed to treat medical conditions that are causing enteritis.       Take your medicine as directed. Contact your healthcare provider if you think your medicine is not helping or if you have side effects. Tell him of her if you are allergic to any medicine. Keep a list of the medicines, vitamins, and herbs you take. Include the amounts, and when and why you take them. Bring the list or the pill bottles to follow-up visits. Carry your medicine list with you in case of an emergency.    Manage enteritis:     Eat foods that help to decrease symptoms. Limit or avoid foods and liquids that are high in sugar, fat, and fiber to help relieve diarrhea. It may be helpful to avoid lactose. Lactose is a type of sugar that is found in milk products. You may be able to tolerate soups, broths, well-cooked vegetables, canned fruit, and baked or broiled meats. Ask your dietitian or healthcare provider if you should follow a special diet. You may need to avoid other foods if you have certain medical conditions such as celiac disease.       Drink liquids as directed. Ask how much liquid to drink each day and which liquids are best for you. It is important to prevent or treat dehydration. Even if you have been vomiting, suck on ice chips or take small sips of clear liquids often. Slowly increase the amount of clear liquids you drink. If you become dehydrated, you may need IV liquids.      Drink an oral rehydration solution (ORS) as directed. An ORS contains water, salts, and sugar that are needed to replace lost body fluids. Ask what kind of ORS to use, how much to drink, and where to get it.    Prevent enteritis: Enteritis that is caused by bacteria, parasites, or viruses can be prevented. The following may help to prevent this type of enteritis:    Wash your hands often. Use soap and water. Wash your hands after you use the bathroom, change a child's diapers, or sneeze. Wash your hands before you prepare or eat food. Handwashing           Clean surfaces and do laundry often. Wash your clothes and towels separately from the rest of the laundry. Clean surfaces in your home with antibacterial  or bleach.      Clean food thoroughly and cook safely. Wash raw vegetables before you cook. Cook meat, fish, and eggs fully. Do not use the same dishes for raw meat as you do for other foods. Refrigerate any leftover food immediately.      Be aware when you camp or travel. Drink only clean water. Do not drink from rivers or lakes unless you purify or boil the water first. When you travel, drink bottled water and do not add ice. Do not eat fruit that has not been peeled. Do not eat raw fish or meat that is not fully cooked.     Follow up with your healthcare provider as directed: Write down your questions so you remember to ask them during your visits.

## 2020-08-07 NOTE — ED PROVIDER NOTE - PATIENT PORTAL LINK FT
You can access the FollowMyHealth Patient Portal offered by [facility] by registering at the following website: [email or web address]. By joining [employer]’s FollowMyHealth portal, you will also be able to view your health information using other applications (apps) compatible with our system.

## 2020-08-07 NOTE — ED PROVIDER NOTE - PROGRESS NOTE DETAILS
Patient walked to the bathroom.  Feeling better.  Wants to go home.  Will take off from work today and take meds and rest.  Understands to return for worsening pain, fevers, or inability to tolerate PO.

## 2020-08-07 NOTE — ED PROVIDER NOTE - CLINICAL SUMMARY MEDICAL DECISION MAKING FREE TEXT BOX
CT showing terminal ileitis.  Patient given IVFs and empiric antibiotics in ED.  Patient to be discharged on oral antibiotics with GI follow up.

## 2020-08-07 NOTE — ED ADULT NURSE NOTE - NSIMPLEMENTINTERV_GEN_ALL_ED
Implemented All Universal Safety Interventions:  [place] to call system. Call bell, personal items and telephone within reach. Instruct patient to call for assistance. Room bathroom lighting operational. Non-slip footwear when patient is off stretcher. Physically safe environment: no spills, clutter or unnecessary equipment. Stretcher in lowest position, wheels locked, appropriate side rails in place.

## 2020-08-07 NOTE — ED ADULT NURSE NOTE - CHIEF COMPLAINT QUOTE
Ambulatory sent from  to r/o diverticulitis. Patient works for [employer] and worked outside all day to come home with myalgias and LLQ pain that radiated to his flank but he thought it was heat exhaustion. Had one episode of vomiting and then felt better. Woke this morning and has been nauseous since breakfast, had another episode of vomiting and Tmax 102 prior to going to urgent care.
  75 year old white female with history of normal colonoscopy 10 years ago by Dr. Vyas.  She returns for screening colonoscopy.  There is no family history of colon cancer or polyps.  No major interval illness.

## 2020-08-07 NOTE — ED PROVIDER NOTE - GASTROINTESTINAL, MLM
Abdomen obese; soft; +right lower quadrant tenderness with deep palpation without rebound or guarding.  +mild LLQ tenderness.  normal bowel sounds.

## 2020-08-07 NOTE — ED ADULT NURSE NOTE - OBJECTIVE STATEMENT
pt c/o lower abdominal pain radiating from left to right starting 2 days ago with nausea/vomiting/fever. Pt alert and oriented x4. PT hx toncillectomy in 2017. Pt safety maintained

## 2020-08-07 NOTE — ED PROVIDER NOTE - OBJECTIVE STATEMENT
Pt. is a 28 yo M without any medical problems or surgeries presenting with abdominal pain, nausea, low grade fever X 2 days.  Patient states he has left sided abdominal pain and back pain.  He went to an urgent care center and was sent to the ED to r/o diverticulitis.  +regular stools.  No dysuria or hematuria.  Was feeling lightheaded so came to the ED.  No meds taken prior to arrival.

## 2020-08-07 NOTE — ED PROVIDER NOTE - CARE PROVIDER_API CALL
Sulma Rivero  GASTROENTEROLOGY  [address]  [place] [address]  Phone: [phone]  Fax: [phone]  Follow Up Time:

## 2020-08-08 LAB
CULTURE RESULTS: SIGNIFICANT CHANGE UP
SPECIMEN SOURCE: SIGNIFICANT CHANGE UP

## 2020-08-12 LAB
CULTURE RESULTS: SIGNIFICANT CHANGE UP
CULTURE RESULTS: SIGNIFICANT CHANGE UP
SPECIMEN SOURCE: SIGNIFICANT CHANGE UP
SPECIMEN SOURCE: SIGNIFICANT CHANGE UP

## 2020-12-15 PROBLEM — Z87.09 HISTORY OF PHARYNGITIS: Status: RESOLVED | Noted: 2017-02-21 | Resolved: 2020-12-15

## 2020-12-15 PROBLEM — J02.0 STREP THROAT: Status: RESOLVED | Noted: 2017-02-21 | Resolved: 2020-12-15

## 2021-09-11 ENCOUNTER — EMERGENCY (EMERGENCY)
Facility: HOSPITAL | Age: 31
LOS: 0 days | Discharge: HOME | End: 2021-09-11
Attending: EMERGENCY MEDICINE
Payer: COMMERCIAL

## 2021-09-11 VITALS
RESPIRATION RATE: 18 BRPM | HEART RATE: 69 BPM | OXYGEN SATURATION: 98 % | DIASTOLIC BLOOD PRESSURE: 75 MMHG | SYSTOLIC BLOOD PRESSURE: 125 MMHG | TEMPERATURE: 98 F

## 2021-09-11 VITALS — HEIGHT: 75 IN | WEIGHT: 235.01 LBS

## 2021-09-11 DIAGNOSIS — M54.16 RADICULOPATHY, LUMBAR REGION: ICD-10-CM

## 2021-09-11 DIAGNOSIS — Z87.39 PERSONAL HISTORY OF OTHER DISEASES OF THE MUSCULOSKELETAL SYSTEM AND CONNECTIVE TISSUE: ICD-10-CM

## 2021-09-11 DIAGNOSIS — M54.5 LOW BACK PAIN: ICD-10-CM

## 2021-09-11 DIAGNOSIS — R20.2 PARESTHESIA OF SKIN: ICD-10-CM

## 2021-09-11 PROCEDURE — 99284 EMERGENCY DEPT VISIT MOD MDM: CPT

## 2021-09-11 RX ORDER — IBUPROFEN 200 MG
1 TABLET ORAL
Qty: 30 | Refills: 0
Start: 2021-09-11

## 2021-09-11 RX ORDER — CYCLOBENZAPRINE HYDROCHLORIDE 10 MG/1
1 TABLET, FILM COATED ORAL
Qty: 15 | Refills: 0
Start: 2021-09-11

## 2021-09-11 RX ORDER — IBUPROFEN 200 MG
600 TABLET ORAL ONCE
Refills: 0 | Status: COMPLETED | OUTPATIENT
Start: 2021-09-11 | End: 2021-09-11

## 2021-09-11 RX ORDER — OXYCODONE AND ACETAMINOPHEN 5; 325 MG/1; MG/1
1 TABLET ORAL ONCE
Refills: 0 | Status: DISCONTINUED | OUTPATIENT
Start: 2021-09-11 | End: 2021-09-11

## 2021-09-11 RX ADMIN — OXYCODONE AND ACETAMINOPHEN 1 TABLET(S): 5; 325 TABLET ORAL at 10:51

## 2021-09-11 RX ADMIN — Medication 600 MILLIGRAM(S): at 10:51

## 2021-09-11 RX ADMIN — Medication 50 MILLIGRAM(S): at 10:51

## 2021-09-11 NOTE — ED STATDOCS - NSICDXFAMILYHX_GEN_ALL_CORE_FT
FAMILY HISTORY:  Father  Still living? Yes, Estimated age: Age Unknown  Family history of essential hypertension, Age at diagnosis: Age Unknown  Family history of kidney stones, Age at diagnosis: Age Unknown

## 2021-09-11 NOTE — ED STATDOCS - PATIENT PORTAL LINK FT
You can access the FollowMyHealth Patient Portal offered by [facility] by registering at the following website: [email or web address]. By joining [employer]’s FollowMyHealth portal, you will also be able to view your health information using other applications (apps) compatible with our system.

## 2021-09-11 NOTE — ED STATDOCS - NSFOLLOWUPINSTRUCTIONS_ED_ALL_ED_FT
Lumbosacral Radiculopathy      Lumbosacral radiculopathy is a condition that involves the spinal nerves and nerve roots in the low back and bottom of the spine. The condition develops when these nerves and nerve roots move out of place or become inflamed and cause symptoms.      What are the causes?  This condition may be caused by:  •Pressure from a disk that bulges out of place (herniated disk). A disk is a plate of soft cartilage that separates bones in the spine.      •Disk changes that occur with age (disk degeneration).      •A narrowing of the bones of the lower back (spinal stenosis).      •A tumor.      •An infection.      •An injury that places sudden pressure on the disks that cushion the bones of your lower spine.        What increases the risk?  You are more likely to develop this condition if:  •You are a male who is 30–50 years old.      •You are a female who is 50–60 years old.      •You use improper technique when lifting things.      •You are overweight or live a sedentary lifestyle.      •Your work requires frequent lifting.      •You smoke.      •You do repetitive activities that strain the spine.        What are the signs or symptoms?  Symptoms of this condition include:  •Pain that goes down from your back into your legs (sciatica), usually on one side of the body. This is the most common symptom. The pain may be worse with sitting, coughing, or sneezing.      •Pain and numbness in your legs.      •Muscle weakness.      •Tingling.      •Loss of bladder control or bowel control.        How is this diagnosed?  This condition may be diagnosed based on:  •Your symptoms and medical history.      •A physical exam.    If the pain is lasting, you may have tests, such as:  •MRI scan.      •X-ray.      •CT scan.      •A type of X-ray used to examine the spinal canal after injecting a dye into your spine (myelogram).      •A test to measure how electrical impulses move through a nerve (nerve conduction study).        How is this treated?  Treatment may depend on the cause of the condition and may include:  •Working with a physical therapist.      •Taking pain medicine.      •Applying heat and ice to affected areas.      •Doing stretches to improve flexibility.      •Doing exercises to strengthen back muscles.      •Having chiropractic spinal manipulation.      •Using transcutaneous electrical nerve stimulation (TENS) therapy.      •Getting a steroid injection in the spine.      In some cases, no treatment is needed. If the condition is long-lasting (chronic), or if symptoms are severe, treatment may involve surgery or lifestyle changes, such as following a weight-loss plan.      Follow these instructions at home:    Activity     •Avoid bending and other activities that make the problem worse.    •Maintain a proper position when standing or sitting:   •When standing, keep your upper back and neck straight, with your shoulders pulled back. Avoid slouching.       •When sitting, keep your back straight and relax your shoulders. Do not round your shoulders or pull them backward.        • Do not sit or  one place for long periods of time.      •Take brief periods of rest throughout the day. This will reduce your pain. It is usually better to rest by lying down or standing, not sitting.      •When you are resting for longer periods, mix in some mild activity or stretching between periods of rest. This will help to prevent stiffness and pain.       •Get regular exercise. Ask your health care provider what activities are safe for you. If you were shown how to do any exercises or stretches, do them as directed by your health care provider.    • Do not lift anything that is heavier than 10 lb (4.5 kg) or the limit that you are told by your health care provider. Always use proper lifting technique, which includes:  •Bending your knees.       •Keeping the load close to your body.       •Avoiding twisting.         Managing pain   •If directed, put ice on the affected area:  •Put ice in a plastic bag.       •Place a towel between your skin and the bag.      •Leave the ice on for 20 minutes, 2–3 times a day.      •If directed, apply heat to the affected area as often as told by your health care provider. Use the heat source that your health care provider recommends, such as a moist heat pack or a heating pad.  •Place a towel between your skin and the heat source.       •Leave the heat on for 20–30 minutes.       •Remove the heat if your skin turns bright red. This is especially important if you are unable to feel pain, heat, or cold. You may have a greater risk of getting burned.        •Take over-the-counter and prescription medicines only as told by your health care provider.      General instructions     •Sleep on a firm mattress in a comfortable position. Try lying on your side with your knees slightly bent. If you lie on your back, put a pillow under your knees.      • Do not drive or use heavy machinery while taking prescription pain medicine.      •If your health care provider prescribed a diet or exercise program, follow it as directed.      •Keep all follow-up visits as told by your health care provider. This is important.        Contact a health care provider if:    •Your pain does not improve over time, even when taking pain medicines.        Get help right away if:    •You develop severe pain.      •Your pain suddenly gets worse.      •You develop increasing weakness in your legs.      •You lose the ability to control your bladder or bowel.      •You have difficulty walking or balancing.      •You have a fever.        Summary    •Lumbosacral radiculopathy is a condition that occurs when the spinal nerves and nerve roots in the lower part of the spine move out of place or become inflamed and cause symptoms.      •Symptoms include pain, numbness, and tingling that go down from your back into your legs (sciatica), muscle weakness, and loss of bladder control or bowel control.      •If directed, apply ice or heat to the affected area as told by your health care provider.      •Follow instructions about activity, rest, and proper lifting technique.      This information is not intended to replace advice given to you by your health care provider. Make sure you discuss any questions you have with your health care provider.

## 2021-09-11 NOTE — ED ADULT NURSE NOTE - OBJECTIVE STATEMENT
Pt c/o lower back pain radiating to right leg. pt is able to ambulate on his own. h/o lower back herniated  disc.

## 2021-09-11 NOTE — ED STATDOCS - PROGRESS NOTE DETAILS
31 y/o M with PMH of lumbar disc herniation presents with worsening right sided back pain. States pain has worsen x 3 days. Denies trauma. States pain radiates into right leg. Last saw his spine specialist 2 months ago, had epidural at that time. Has follow up planned with Rambo Kaba for further evaluation. Denies fever, chills, loss of bowel/bladder control. Pt is ambulatory. PE: Well appearing. Cardiac: s1s2, RRR. Lungs: CTAB. Abdomen: NBS x4, soft, nontender. MSK: NO midline lumbar tenderness. +right lumbar paraspinal tenderness. Neuro: Sensation intact to light touch in lower extremities. 5/5 strength in lower extremities. Patient ambulatory. A/P: Acute exacerbation of chronic back pain. Plan for symptomatic care, [place] home. - Hola العراقي PA-C

## 2021-09-11 NOTE — ED ADULT TRIAGE NOTE - CHIEF COMPLAINT QUOTE
Pt presents to er with complaints of lower back pain and had 2 epidurals, last one was end of July 2021, pt sees  for pain management.

## 2021-09-11 NOTE — ED STATDOCS - OBJECTIVE STATEMENT
29 y/o male with PMHx of 2 slipped discs presents to the ED c/o severe back pain. States since this Thursday, the pain has been worsening. Also c/o some radiation of pain down into right leg as well tingling in all his right toes. Hx of 3 epidurals in the past, last one was in July. Pt tried to schedule an appointment with his spine MD in [place], but could only got a consultation. No new injuries or trauma. Has not been on steroids recently. Did not drive to ED.

## 2021-09-11 NOTE — ED STATDOCS - CLINICAL SUMMARY MEDICAL DECISION MAKING FREE TEXT BOX
Pt with lumbar radiculopathy.  No signs of cord compression on exam.  D/c home f/u with spine doctor.

## 2021-09-11 NOTE — ED STATDOCS - PRINCIPAL DIAGNOSIS
Lumbar back pain with radiculopathy affecting right lower extremity Complex Repair And Tissue Cultured Epidermal Autograft Text: The defect edges were debeveled with a #15 scalpel blade.  The primary defect was closed partially with a complex linear closure.  Given the location of the defect, shape of the defect and the proximity to free margins an tissue cultured epidermal autograft was deemed most appropriate to repair the remaining defect.  The graft was trimmed to fit the size of the remaining defect.  The graft was then placed in the primary defect, oriented appropriately, and sutured into place.

## 2021-11-16 NOTE — ED STATDOCS - DISPOSITION TYPE
[facility]  By Dr. Evie Rincon  CC: colon cancer screening     HPI:  Bree Olguin is a 52year old male. Evaluation has been requested for colon cancer screening. No GI complaints at present.   The patient is here to discuss colon exertion  CARDIOVASCULAR: denies chest pain on exertion  GI: as above  : denies nocturia or changes in stream  MUSCULOSKELETAL: denies back pain  NEURO: denies headaches  PSYCHE: denies depression or anxiety  HEMATOLOGIC: denies hx of anemia  ENDOCRINE: DISCHARGE

## 2022-07-14 NOTE — ED PROVIDER NOTE - EYES NEGATIVE STATEMENT, MLM
: TSH mildly elevated to 4.58 on 6/16  TSH repeat 0.86 with free T4 1.8 on 6/26  Recommendations:  - c/w LT4 50mcg PO qAM,   -Please make sure LT4 is given on an empty stomach at least one hour apart from other meds and food to ensure med absorption. DO NOT GIVE WITH VITAMINS/ANTACIDS. no discharge, no irritation, no pain, no redness, and no visual changes.

## 2023-01-02 ENCOUNTER — EMERGENCY (EMERGENCY)
Facility: HOSPITAL | Age: 33
LOS: 1 days | Discharge: LEFT BEFORE DISCHARGE | End: 2023-01-02
Payer: COMMERCIAL

## 2023-01-02 VITALS — WEIGHT: 259.93 LBS | HEIGHT: 75 IN

## 2023-01-02 DIAGNOSIS — H57.12 OCULAR PAIN, LEFT EYE: ICD-10-CM

## 2023-01-02 DIAGNOSIS — Z53.21 PROCEDURE AND TREATMENT NOT CARRIED OUT DUE TO PATIENT LEAVING PRIOR TO BEING SEEN BY HEALTH CARE PROVIDER: ICD-10-CM

## 2023-01-02 PROCEDURE — L9992: CPT

## 2023-01-02 NOTE — ED ADULT TRIAGE NOTE - CHIEF COMPLAINT QUOTE
Pt. to the ED C/O Right Eye Pain- Pt. denies injuries and states he was Diagnosed with Corneal Abrasion [place]- Was prescribed Saline Drops

## 2023-01-02 NOTE — ED ADULT TRIAGE NOTE - HEIGHT IN FEET
Please place samples order and route back to me, thanks.    Xarelto 20 mg tabs  1 tab PO daily  LOT: [id number]  EXP: 9/2019  Dispense: 28 tabs  4 bottles  
6

## 2023-06-01 ENCOUNTER — NON-APPOINTMENT (OUTPATIENT)
Age: 33
End: 2023-06-01

## 2023-06-01 ENCOUNTER — EMERGENCY (EMERGENCY)
Facility: HOSPITAL | Age: 33
LOS: 0 days | Discharge: HOME | End: 2023-06-01
Attending: EMERGENCY MEDICINE
Payer: COMMERCIAL

## 2023-06-01 VITALS
DIASTOLIC BLOOD PRESSURE: 78 MMHG | HEART RATE: 77 BPM | OXYGEN SATURATION: 95 % | TEMPERATURE: 98 F | RESPIRATION RATE: 20 BRPM | HEIGHT: 75 IN | SYSTOLIC BLOOD PRESSURE: 121 MMHG | WEIGHT: 270.07 LBS

## 2023-06-01 DIAGNOSIS — A53.9 SYPHILIS, UNSPECIFIED: ICD-10-CM

## 2023-06-01 PROCEDURE — 36415 COLL VENOUS BLD VENIPUNCTURE: CPT

## 2023-06-01 PROCEDURE — 99283 EMERGENCY DEPT VISIT LOW MDM: CPT | Mod: 25

## 2023-06-01 PROCEDURE — 86780 TREPONEMA PALLIDUM: CPT

## 2023-06-01 PROCEDURE — 96372 THER/PROPH/DIAG INJ SC/IM: CPT

## 2023-06-01 PROCEDURE — 99284 EMERGENCY DEPT VISIT MOD MDM: CPT

## 2023-06-01 RX ORDER — PENICILLIN G BENZATHINE 1200000 [IU]/2ML
2.4 INJECTION, SUSPENSION INTRAMUSCULAR ONCE
Refills: 0 | Status: COMPLETED | OUTPATIENT
Start: 2023-06-01 | End: 2023-06-01

## 2023-06-01 RX ADMIN — PENICILLIN G BENZATHINE 2.4 MILLION UNIT(S): 1200000 INJECTION, SUSPENSION INTRAMUSCULAR at 09:54

## 2023-06-01 NOTE — ED PROVIDER NOTE - OBJECTIVE STATEMENT
32-year-old male without significant past medical history presents for positive outpatient syphilis test.  Patient had STI testing with his PCP and came back positive for syphilis.  PCP did not prescribe any antibiotics.  Patient states he has no symptoms but did have a rash on his upper extremities at some point a few months ago.  Patient denies any ulcerative lesions or rash in the genital region.  No history of sexually transmitted infections.  Patient notes his wife is 7 months pregnant and she tested negative for syphilis at this time.

## 2023-06-01 NOTE — ED ADULT TRIAGE NOTE - CHIEF COMPLAINT QUOTE
Pts arrives to ED for +syphillis diagnosis. Pt seeking treatment. Denies other pertinent medical history.

## 2023-06-01 NOTE — ED ADULT NURSE NOTE - SUICIDE SCREENING DEPRESSION
Negative Zyclara Counseling:  I discussed with the patient the risks of imiquimod including but not limited to erythema, scaling, itching, weeping, crusting, and pain.  Patient understands that the inflammatory response to imiquimod is variable from person to person and was educated regarded proper titration schedule.  If flu-like symptoms develop, patient knows to discontinue the medication and contact us.

## 2023-06-01 NOTE — ED PROVIDER NOTE - CLINICAL SUMMARY MEDICAL DECISION MAKING FREE TEXT BOX
Patient with positive syphilis screen on outpatient labs. Will repeat labs to confirm and will give penicillin.

## 2023-06-01 NOTE — ED PROVIDER NOTE - PATIENT PORTAL LINK FT
You can access the FollowMyHealth Patient Portal offered by [facility] by registering at the following website: [email or web address]. By joining [facility]’s FollowMyHealth portal, you will also be able to view your health information using other applications (apps) compatible with our system.

## 2023-06-01 NOTE — ED ADULT NURSE NOTE - NSFALLUNIVINTERV_ED_ALL_ED
Bed/Stretcher in lowest position, wheels locked, appropriate side rails in place/Call bell, personal items and telephone in reach/Instruct patient to call for assistance before getting out of bed/chair/stretcher/Non-slip footwear applied when patient is off stretcher/[place] to call system/Physically safe environment - no spills, clutter or unnecessary equipment/Purposeful proactive rounding/Room/bathroom lighting operational, light cord in reach

## 2023-06-01 NOTE — ED PROVIDER NOTE - CONDITION AT DISCHARGE:
[facility]  coding opportunities     I47 1     Chart Reviewed number of suggestions sent to Provider: 1     Patients Insurance     Medicare Insurance: [employer] Satisfactory

## 2023-06-02 LAB — T PALLIDUM AB TITR SER: NEGATIVE — SIGNIFICANT CHANGE UP

## 2023-06-04 NOTE — ED PROVIDER NOTE - NS HIV RISK FACTOR YES
Please review; protocol failed. Requested Prescriptions   Pending Prescriptions Disp Refills    LORazepam 1 MG Oral Tab 270 tablet 1     Sig: Take 1 tablet (1 mg total) by mouth 3 (three) times daily.        There is no refill protocol information for this order              Recent Outpatient Visits              5 days ago Encounter for annual health examination    Ignacia Erwin MD    Office Visit    1 month ago Migraine without aura and with status migrainosus, not intractable    Salo Mann MD    Telemedicine    3 months ago Jessie eLes, [address] [facility], [address], Kennedy Perkins MD    Office Visit    8 months ago Chronic diarrhea    Danisha Patton MD    Office Visit    9 months ago Aung Rodriguez, Harjeet Thakkar MD    Office Visit
Declined

## 2023-09-15 NOTE — ED ADULT NURSE NOTE - NSFALLRSKINDICATORS_ED_ALL_ED
[facility] Division of Hospital Medicine    Chief Complaint:      INTERVAL HISTORY:  Yesterday,     Overnight,     Patient seen and examined at bedside this morning. Denies any acute complaints.     Patient denies chest pain, SOB, abd pain, N/V, fever, chills, dysuria or any other complaints. All remainder ROS negative.     MEDICATIONS  (STANDING):  aspirin enteric coated 81 milliGRAM(s) Oral daily  cefTRIAXone Injectable. 1000 milliGRAM(s) IV Push every 24 hours  clopidogrel Tablet 75 milliGRAM(s) Oral daily  gabapentin 100 milliGRAM(s) Oral every 8 hours  heparin   Injectable 5000 Unit(s) SubCutaneous every 12 hours  hydrALAZINE 75 milliGRAM(s) Oral three times a day  influenza  Vaccine (HIGH DOSE) 0.7 milliLiter(s) IntraMuscular once  metoprolol succinate ER 50 milliGRAM(s) Oral daily  mirtazapine 7.5 milliGRAM(s) Oral at bedtime  mirtazapine 15 milliGRAM(s) Oral daily  pantoprazole    Tablet 40 milliGRAM(s) Oral every 12 hours  sertraline 50 milliGRAM(s) Oral daily  sodium chloride 0.9%. 1000 milliLiter(s) (125 mL/Hr) IV Continuous <Continuous>  sucralfate 1 Gram(s) Oral every 6 hours    MEDICATIONS  (PRN):        I&O's Summary    14 Sep 2023 07:01  -  15 Sep 2023 07:00  --------------------------------------------------------  IN: 220 mL / OUT: 2600 mL / NET: -2380 mL    15 Sep 2023 07:01  -  15 Sep 2023 19:13  --------------------------------------------------------  IN: 775 mL / OUT: 325 mL / NET: 450 mL        PHYSICAL EXAM:  Vital Signs Last 24 Hrs  T(C): 36.6 (15 Sep 2023 16:55), Max: 36.8 (15 Sep 2023 11:29)  T(F): 97.8 (15 Sep 2023 16:55), Max: 98.2 (15 Sep 2023 11:29)  HR: 84 (15 Sep 2023 16:55) (69 - 84)  BP: 120/61 (15 Sep 2023 16:55) (120/61 - 170/73)  BP(mean): --  RR: 18 (15 Sep 2023 16:55) (18 - 18)  SpO2: 98% (15 Sep 2023 16:55) (96% - 98%)    Parameters below as of 15 Sep 2023 16:55  Patient On (Oxygen Delivery Method): room air          CONSTITUTIONAL: No apparent distress  HEENT: Normocephalic, Atraumatic. Trachea midline.  RESPIRATORY:  lungs are clear to auscultation bilaterally, breath sounds equal bilaterally. No crackles, rhonchi, wheezes  CARDIOVASCULAR: Regular rate and rhythm, normal S1 and S2, no murmur/rub/gallop; No lower extremity edema; Peripheral pulses are 2+ bilaterally  ABDOMEN: Soft, non-distended, nontender to palpation, +BS  MUSCLOSKELETAL: Normal gait; moving all 4 extremities spontaneously  PSYCH: thoughts linear, affect appropriate  NEUROLOGY: Alert, Oriented x3, CN 2-12 grossly intact  SKIN: No rashes    LABS:                        11.2   7.92  )-----------( 426      ( 15 Sep 2023 06:20 )             35.9     09-15    140  |  94<L>  |  26.1<H>  ----------------------------<  78  4.0   |  37.0<H>  |  1.70<H>    Ca    8.9      15 Sep 2023 06:20  Phos  3.6     09-14  Mg     1.3     09-14            Urinalysis Basic - ( 15 Sep 2023 06:20 )    Color: x / Appearance: x / SG: x / pH: x  Gluc: 78 mg/dL / Ketone: x  / Bili: x / Urobili: x   Blood: x / Protein: x / Nitrite: x   Leuk Esterase: x / RBC: x / WBC x   Sq Epi: x / Non Sq Epi: x / Bacteria: x        CAPILLARY BLOOD GLUCOSE            RADIOLOGY & ADDITIONAL TESTS:  Results Reviewed:   Imaging Personally Reviewed:  Electrocardiogram Personally Reviewed:                                           [facility] Division of Hospital Medicine    INTERVAL HISTORY:  Overnight, no acute complaints.    Patient seen and examined at bedside this morning. Denies any acute complaints. Patient denies chest pain, SOB, abd pain, N/V, fever, chills, dysuria or any other complaints. All remainder ROS negative.     MEDICATIONS  (STANDING):  aspirin enteric coated 81 milliGRAM(s) Oral daily  cefTRIAXone Injectable. 1000 milliGRAM(s) IV Push every 24 hours  clopidogrel Tablet 75 milliGRAM(s) Oral daily  gabapentin 100 milliGRAM(s) Oral every 8 hours  heparin   Injectable 5000 Unit(s) SubCutaneous every 12 hours  hydrALAZINE 75 milliGRAM(s) Oral three times a day  influenza  Vaccine (HIGH DOSE) 0.7 milliLiter(s) IntraMuscular once  metoprolol succinate ER 50 milliGRAM(s) Oral daily  mirtazapine 7.5 milliGRAM(s) Oral at bedtime  mirtazapine 15 milliGRAM(s) Oral daily  pantoprazole    Tablet 40 milliGRAM(s) Oral every 12 hours  sertraline 50 milliGRAM(s) Oral daily  sodium chloride 0.9%. 1000 milliLiter(s) (125 mL/Hr) IV Continuous <Continuous>  sucralfate 1 Gram(s) Oral every 6 hours    MEDICATIONS  (PRN):        I&O's Summary    14 Sep 2023 07:01  -  15 Sep 2023 07:00  --------------------------------------------------------  IN: 220 mL / OUT: 2600 mL / NET: -2380 mL    15 Sep 2023 07:01  -  15 Sep 2023 19:13  --------------------------------------------------------  IN: 775 mL / OUT: 325 mL / NET: 450 mL        PHYSICAL EXAM:  Vital Signs Last 24 Hrs  T(C): 36.6 (15 Sep 2023 16:55), Max: 36.8 (15 Sep 2023 11:29)  T(F): 97.8 (15 Sep 2023 16:55), Max: 98.2 (15 Sep 2023 11:29)  HR: 84 (15 Sep 2023 16:55) (69 - 84)  BP: 120/61 (15 Sep 2023 16:55) (120/61 - 170/73)  BP(mean): --  RR: 18 (15 Sep 2023 16:55) (18 - 18)  SpO2: 98% (15 Sep 2023 16:55) (96% - 98%)    Parameters below as of 15 Sep 2023 16:55  Patient On (Oxygen Delivery Method): room air    CONSTITUTIONAL: No apparent distress  HEENT: Normocephalic, Atraumatic. Trachea midline.  RESPIRATORY:  lungs are clear to auscultation bilaterally, breath sounds equal bilaterally. No crackles, rhonchi, wheezes  CARDIOVASCULAR: Regular rate and rhythm, normal S1 and S2, no murmur/rub/gallop; No lower extremity edema; Peripheral pulses are 2+ bilaterally  ABDOMEN: soft, non-tender, non-distended, +BS, + colostomy bag noted, no leaking around colostomy bag, brown stool noted in bag   MUSCLOSKELETAL: moving all 4 extremities spontaneously  PSYCH: thoughts linear, affect appropriate  NEUROLOGY: Alert, Oriented x3, CN 2-12 grossly intact  SKIN: No rashes      LABS:                        11.2   7.92  )-----------( 426      ( 15 Sep 2023 06:20 )             35.9     09-15    140  |  94<L>  |  26.1<H>  ----------------------------<  78  4.0   |  37.0<H>  |  1.70<H>    Ca    8.9      15 Sep 2023 06:20  Phos  3.6     09-14  Mg     1.3     09-14            Urinalysis Basic - ( 15 Sep 2023 06:20 )    Color: x / Appearance: x / SG: x / pH: x  Gluc: 78 mg/dL / Ketone: x  / Bili: x / Urobili: x   Blood: x / Protein: x / Nitrite: x   Leuk Esterase: x / RBC: x / WBC x   Sq Epi: x / Non Sq Epi: x / Bacteria: x        CAPILLARY BLOOD GLUCOSE            RADIOLOGY & ADDITIONAL TESTS:  Results Reviewed:   Imaging Personally Reviewed:  Electrocardiogram Personally Reviewed:                                           no

## 2023-11-27 ENCOUNTER — APPOINTMENT (OUTPATIENT)
Dept: ORTHOPEDIC SURGERY | Facility: CLINIC | Age: 33
End: 2023-11-27
Payer: COMMERCIAL

## 2023-11-27 VITALS
WEIGHT: 270 LBS | HEART RATE: 106 BPM | OXYGEN SATURATION: 98 % | BODY MASS INDEX: 33.57 KG/M2 | SYSTOLIC BLOOD PRESSURE: 118 MMHG | DIASTOLIC BLOOD PRESSURE: 72 MMHG | HEIGHT: 75 IN

## 2023-11-27 DIAGNOSIS — M54.16 RADICULOPATHY, LUMBAR REGION: ICD-10-CM

## 2023-11-27 PROCEDURE — 99204 OFFICE O/P NEW MOD 45 MIN: CPT

## 2023-11-27 RX ORDER — METHYLPREDNISOLONE 4 MG/1
4 TABLET ORAL
Qty: 1 | Refills: 1 | Status: ACTIVE | COMMUNITY
Start: 2023-11-27 | End: 1900-01-01

## 2024-01-14 NOTE — ED ADULT TRIAGE NOTE - HEIGHT IN INCHES
Take antibiotics as prescribed. We will call you if any of your testing comes back positive.     Please follow-up with your family doctor and urologist. Return to the ER with any worsening symptoms.   
3

## 2024-05-08 ENCOUNTER — EMERGENCY (EMERGENCY)
Facility: HOSPITAL | Age: 34
LOS: 0 days | Discharge: HOME | End: 2024-05-08
Attending: STUDENT IN AN ORGANIZED HEALTH CARE EDUCATION/TRAINING PROGRAM
Payer: COMMERCIAL

## 2024-05-08 VITALS
OXYGEN SATURATION: 96 % | DIASTOLIC BLOOD PRESSURE: 81 MMHG | SYSTOLIC BLOOD PRESSURE: 120 MMHG | RESPIRATION RATE: 17 BRPM | HEART RATE: 97 BPM | TEMPERATURE: 98 F

## 2024-05-08 VITALS
SYSTOLIC BLOOD PRESSURE: 131 MMHG | WEIGHT: 259.93 LBS | TEMPERATURE: 99 F | HEART RATE: 82 BPM | RESPIRATION RATE: 18 BRPM | OXYGEN SATURATION: 98 % | DIASTOLIC BLOOD PRESSURE: 85 MMHG | HEIGHT: 75 IN

## 2024-05-08 DIAGNOSIS — S05.01XA INJURY OF CONJUNCTIVA AND CORNEAL ABRASION WITHOUT FOREIGN BODY, RIGHT EYE, INITIAL ENCOUNTER: ICD-10-CM
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
## 2024-05-08 PROCEDURE — 99284 EMERGENCY DEPT VISIT MOD MDM: CPT

## 2024-05-08 PROCEDURE — 99283 EMERGENCY DEPT VISIT LOW MDM: CPT

## 2024-05-08 RX ORDER — FLUORESCEIN SODIUM 9 MG
1 STRIP OPHTHALMIC (EYE) ONCE
Refills: 0 | Status: COMPLETED | OUTPATIENT
Start: 2024-05-08 | End: 2024-05-08

## 2024-05-08 RX ORDER — CIPROFLOXACIN HCL 0.3 %
1 DROPS OPHTHALMIC (EYE) ONCE
Refills: 0 | Status: COMPLETED | OUTPATIENT
Start: 2024-05-08 | End: 2024-05-08

## 2024-05-08 RX ADMIN — Medication 1 DROP(S): at 22:25

## 2024-05-08 RX ADMIN — Medication 1 DROP(S): at 22:24

## 2024-05-08 RX ADMIN — Medication 1 APPLICATION(S): at 22:25

## 2024-05-08 NOTE — ED STATDOCS - OBJECTIVE STATEMENT
Pt is a 33 yr old male presenting to the ED with a c/c of possible foreign body in eye. Pt states on Monday, he was drilling wood and metal with safety goggles and started having discomfort in his eye. Pt states he went to his opthomologist who stated he has a cornea abrasion. Pt  went to an urgent care and had a drop of tetracaine in his eye. Pt reports having continued light sensitivity and fells like something is still in his eye. Pt denies any fever or chills Pt is a 33 yr old male presenting to the ED with a c/c of possible foreign body in right eye. Pt has no PMHx. Pt states on Monday, he was drilling wood and metal with safety goggles and started having discomfort in his eye. Pt states he went to his opthomologist who stated he has a cornea abrasion. Pt  went to an urgent care and had a drop of tetracaine to his eye. Pt reports having continued light sensitivity and fells like something is still in his eye. Pt denies any fever or chills

## 2024-05-08 NOTE — ED ADULT TRIAGE NOTE - CHIEF COMPLAINT QUOTE
Pt ambulates to ED co eye pain since monday. Pt states he feels as though there is a foreign body in his eye pain has been progressively worse. Pt endorsing photophobia, tearing, swelling and redness, stating "It feels like it's going to pop." Pt states he went to UC at 3pm and given tetracaine with no relief. -significant PMHx, NKDA, AOx4

## 2024-05-08 NOTE — ED STATDOCS - PHYSICAL EXAMINATION
GENERAL: A&Ox4, non-toxic appearing, uncomfortable appearing  HEENT: NCAT, EOMI, oral mucosa moist  R EYE: eyelid edema, scleral injection, tearing, no obvious foreign body, pupil 4mm reactive  RESP: no respiratory distress, speaking in full sentences  CV: RRR  MSK: no visible deformities  NEURO: no focal sensory or motor deficits, CN 2-12 grossly intact  SKIN: warm, normal color, well perfused, no rash  PSYCH: normal affect

## 2024-05-08 NOTE — ED STATDOCS - NSFOLLOWUPINSTRUCTIONS_ED_ALL_ED_FT
2 drops in right eye 4 times a day until you see the eye doctor  Follow up tomorrow with your eye doctor  Any worsening of symptoms or new concerning symptoms return to the ED   Please use 650mg tylenol (or acetaminophen) every 6 hours and 600mg motrin (or advil or ibuprofen) every 6 hours as needed for pain/discomfort/swelling.  Make sure not to use more than 3500mg in any 24 hour period.

## 2024-05-08 NOTE — ED STATDOCS - CLINICAL SUMMARY MEDICAL DECISION MAKING FREE TEXT BOX
33-year-old male, uncomfortable appearing, presents for foreign body in the right eye.  Was drilling metal and wood prior but symptoms were fairly delayed after this.  Has photophobia, eye tearing, scleral injection, and eyelid edema.  Will give tetracaine and consider cycloplegics for pain control, stain for eye exam, foreign body removal as needed, reassess.

## 2024-05-08 NOTE — ED ADULT NURSE NOTE - OBJECTIVE STATEMENT
Pt presents to the ED with c/o R eye pain. Pt states he was feeling discomfort in his eye Monday s/p working with wood and metal. Pain has gotten worse since. Pt states "it feels like there's a big piece of rock or glass in my eye" "it feels like there is pressure in my eye and it is going to burst. Pt states he is sensitive to light.

## 2024-05-08 NOTE — ED STATDOCS - PROGRESS NOTE DETAILS
Sidle: pt shaking and crying with insertion of tetracaine. corneal abrasion 6 o clock on woods lamp. pain then improved once the tetracaine started working and was able to open his eye.   IOP right eye: 18,19,18  pt requesting a bandaid contact lens with antibiotics. discussed that we don't have  that here. will need fu with his eye doctor. Discussed with patient need to return to ED if symptoms don't continue to improve or recur or develops any new or worsening symptoms that are of concern.

## 2024-05-08 NOTE — ED STATDOCS - PATIENT PORTAL LINK FT
You can access the FollowMyHealth Patient Portal offered by [facility] by registering at the following website: [email or web address]. By joining [employer]’s FollowMyHealth portal, you will also be able to view your health information using other applications (apps) compatible with our system.

## 2024-05-10 ENCOUNTER — OFFICE (OUTPATIENT)
Dept: URBAN - METROPOLITAN AREA CLINIC 35 | Facility: CLINIC | Age: 34
Setting detail: OPHTHALMOLOGY
End: 2024-05-10
Payer: COMMERCIAL

## 2024-05-10 DIAGNOSIS — S05.02XD: ICD-10-CM

## 2024-05-10 PROCEDURE — 92071 CONTACT LENS FITTING FOR TX: CPT | Mod: RT | Performed by: OPHTHALMOLOGY

## 2024-05-10 PROCEDURE — 65435 CURETTE/TREAT CORNEA: CPT | Mod: RT | Performed by: OPHTHALMOLOGY

## 2024-05-10 PROCEDURE — 65600 REVISION OF CORNEA: CPT | Mod: RT | Performed by: OPHTHALMOLOGY

## 2024-05-10 PROCEDURE — 99213 OFFICE O/P EST LOW 20 MIN: CPT | Mod: 57 | Performed by: OPHTHALMOLOGY

## 2024-05-10 ASSESSMENT — CONFRONTATIONAL VISUAL FIELD TEST (CVF)
OS_FINDINGS: FULL
OD_FINDINGS: FULL

## 2024-05-13 ENCOUNTER — OFFICE (OUTPATIENT)
Dept: URBAN - METROPOLITAN AREA CLINIC 34 | Facility: CLINIC | Age: 34
Setting detail: OPHTHALMOLOGY
End: 2024-05-13
Payer: COMMERCIAL

## 2024-05-13 DIAGNOSIS — S05.02XD: ICD-10-CM

## 2024-05-13 DIAGNOSIS — H11.31: ICD-10-CM

## 2024-05-13 PROCEDURE — 99213 OFFICE O/P EST LOW 20 MIN: CPT | Performed by: OPHTHALMOLOGY

## 2024-05-13 ASSESSMENT — CONFRONTATIONAL VISUAL FIELD TEST (CVF)
OS_FINDINGS: FULL
OD_FINDINGS: FULL

## 2024-05-17 ENCOUNTER — RX ONLY (RX ONLY)
Age: 34
End: 2024-05-17

## 2024-05-17 ENCOUNTER — OFFICE (OUTPATIENT)
Dept: URBAN - METROPOLITAN AREA CLINIC 35 | Facility: CLINIC | Age: 34
Setting detail: OPHTHALMOLOGY
End: 2024-05-17
Payer: COMMERCIAL

## 2024-05-17 DIAGNOSIS — H11.31: ICD-10-CM

## 2024-05-17 DIAGNOSIS — H18.591: ICD-10-CM

## 2024-05-17 DIAGNOSIS — H18.833: ICD-10-CM

## 2024-05-17 PROBLEM — H16.223R DRY EYE SYNDROME K SICCA;  ,, BOTH EYES: Status: ACTIVE | Noted: 2024-05-17

## 2024-05-17 PROBLEM — H40.053 OCULAR HYPERTENSION; BOTH EYES: Status: ACTIVE | Noted: 2024-05-17

## 2024-05-17 PROBLEM — S05.02XD CORNEAL ABRASION; SUBSEQUENT ENCOUNTER: Status: RESOLVED | Noted: 2024-05-10 | Resolved: 2024-05-17

## 2024-05-17 PROCEDURE — 99213 OFFICE O/P EST LOW 20 MIN: CPT | Performed by: OPHTHALMOLOGY

## 2024-05-17 ASSESSMENT — CONFRONTATIONAL VISUAL FIELD TEST (CVF)
OD_FINDINGS: FULL
OS_FINDINGS: FULL

## 2024-10-26 ENCOUNTER — EMERGENCY (EMERGENCY)
Facility: HOSPITAL | Age: 34
LOS: 0 days | Discharge: HOME | End: 2024-10-26
Attending: STUDENT IN AN ORGANIZED HEALTH CARE EDUCATION/TRAINING PROGRAM
Payer: COMMERCIAL

## 2024-10-26 VITALS
OXYGEN SATURATION: 98 % | SYSTOLIC BLOOD PRESSURE: 120 MMHG | TEMPERATURE: 98 F | DIASTOLIC BLOOD PRESSURE: 70 MMHG | RESPIRATION RATE: 19 BRPM | HEART RATE: 86 BPM

## 2024-10-26 VITALS — WEIGHT: 274.48 LBS

## 2024-10-26 DIAGNOSIS — M54.50 LOW BACK PAIN, UNSPECIFIED: ICD-10-CM

## 2024-10-26 PROCEDURE — 99284 EMERGENCY DEPT VISIT MOD MDM: CPT

## 2024-10-26 PROCEDURE — 99283 EMERGENCY DEPT VISIT LOW MDM: CPT

## 2024-10-26 RX ORDER — CYCLOBENZAPRINE HYDROCHLORIDE 15 MG/1
1 CAPSULE, EXTENDED RELEASE ORAL
Qty: 9 | Refills: 0
Start: 2024-10-26 | End: 2024-10-28

## 2024-10-26 RX ORDER — LIDOCAINE HYDROCHLORIDE 20 MG/ML
1 JELLY TOPICAL ONCE
Refills: 0 | Status: COMPLETED | OUTPATIENT
Start: 2024-10-26 | End: 2024-10-26

## 2024-10-26 RX ORDER — KETOROLAC TROMETHAMINE 30 MG/ML
30 INJECTION, SOLUTION INTRAMUSCULAR; INTRAVENOUS ONCE
Refills: 0 | Status: DISCONTINUED | OUTPATIENT
Start: 2024-10-26 | End: 2024-10-26

## 2024-10-26 RX ORDER — CYCLOBENZAPRINE HYDROCHLORIDE 15 MG/1
10 CAPSULE, EXTENDED RELEASE ORAL ONCE
Refills: 0 | Status: COMPLETED | OUTPATIENT
Start: 2024-10-26 | End: 2024-10-26

## 2024-10-26 RX ORDER — IBUPROFEN 200 MG
600 TABLET ORAL ONCE
Refills: 0 | Status: COMPLETED | OUTPATIENT
Start: 2024-10-26 | End: 2024-10-26

## 2024-10-26 RX ORDER — IBUPROFEN 200 MG
1 TABLET ORAL
Qty: 20 | Refills: 0
Start: 2024-10-26 | End: 2024-10-30

## 2024-10-26 RX ADMIN — Medication 600 MILLIGRAM(S): at 17:22

## 2024-10-26 RX ADMIN — LIDOCAINE HYDROCHLORIDE 1 PATCH: 20 JELLY TOPICAL at 17:11

## 2024-10-26 RX ADMIN — CYCLOBENZAPRINE HYDROCHLORIDE 10 MILLIGRAM(S): 15 CAPSULE, EXTENDED RELEASE ORAL at 17:11

## 2025-02-08 ENCOUNTER — EMERGENCY (EMERGENCY)
Facility: HOSPITAL | Age: 35
LOS: 1 days | Discharge: HOME | End: 2025-02-08
Attending: EMERGENCY MEDICINE | Admitting: EMERGENCY MEDICINE

## 2025-02-08 ENCOUNTER — EMERGENCY (EMERGENCY)
Facility: HOSPITAL | Age: 35
LOS: 0 days | Discharge: ANOTHER HOSPITAL | End: 2025-02-08
Attending: EMERGENCY MEDICINE
Payer: COMMERCIAL

## 2025-02-08 VITALS
WEIGHT: 266.76 LBS | HEART RATE: 90 BPM | DIASTOLIC BLOOD PRESSURE: 82 MMHG | OXYGEN SATURATION: 100 % | SYSTOLIC BLOOD PRESSURE: 134 MMHG | RESPIRATION RATE: 17 BRPM | TEMPERATURE: 98 F

## 2025-02-08 VITALS
DIASTOLIC BLOOD PRESSURE: 92 MMHG | OXYGEN SATURATION: 95 % | TEMPERATURE: 98 F | HEART RATE: 78 BPM | RESPIRATION RATE: 18 BRPM | SYSTOLIC BLOOD PRESSURE: 143 MMHG

## 2025-02-08 VITALS
TEMPERATURE: 99 F | OXYGEN SATURATION: 98 % | HEART RATE: 94 BPM | DIASTOLIC BLOOD PRESSURE: 90 MMHG | SYSTOLIC BLOOD PRESSURE: 142 MMHG | RESPIRATION RATE: 16 BRPM

## 2025-02-08 VITALS
DIASTOLIC BLOOD PRESSURE: 82 MMHG | SYSTOLIC BLOOD PRESSURE: 126 MMHG | TEMPERATURE: 98 F | OXYGEN SATURATION: 93 % | HEIGHT: 75 IN | RESPIRATION RATE: 16 BRPM | WEIGHT: 270.07 LBS | HEART RATE: 92 BPM
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
## 2025-02-08 DIAGNOSIS — S05.01XA INJURY OF CONJUNCTIVA AND CORNEAL ABRASION WITHOUT FOREIGN BODY, RIGHT EYE, INITIAL ENCOUNTER: ICD-10-CM

## 2025-02-08 DIAGNOSIS — R11.0 NAUSEA: ICD-10-CM

## 2025-02-08 DIAGNOSIS — H57.11 OCULAR PAIN, RIGHT EYE: ICD-10-CM
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
## 2025-02-08 LAB
GRAM STN FLD: SIGNIFICANT CHANGE UP
SPECIMEN SOURCE: SIGNIFICANT CHANGE UP

## 2025-02-08 PROCEDURE — 99284 EMERGENCY DEPT VISIT MOD MDM: CPT

## 2025-02-08 PROCEDURE — 99285 EMERGENCY DEPT VISIT HI MDM: CPT

## 2025-02-08 RX ORDER — POLYMYXIN B SULFATE AND TRIMETHOPRIM SULFATE 10000; 1 [USP'U]/ML; MG/ML
1 SOLUTION/ DROPS OPHTHALMIC ONCE
Refills: 0 | Status: COMPLETED | OUTPATIENT
Start: 2025-02-08 | End: 2025-02-08

## 2025-02-08 RX ORDER — TOBRAMYCIN 0.3 %
1 DROPS OPHTHALMIC (EYE)
Refills: 0 | Status: DISCONTINUED | OUTPATIENT
Start: 2025-02-08 | End: 2025-02-11

## 2025-02-08 RX ORDER — IBUPROFEN 200 MG
600 TABLET ORAL ONCE
Refills: 0 | Status: COMPLETED | OUTPATIENT
Start: 2025-02-08 | End: 2025-02-08

## 2025-02-08 RX ORDER — CYCLOPENTOLATE HCL 1 %
1 DROPS OPHTHALMIC (EYE) ONCE
Refills: 0 | Status: DISCONTINUED | OUTPATIENT
Start: 2025-02-08 | End: 2025-02-11

## 2025-02-08 RX ORDER — FLUORESCEIN SODIUM 0.6 MG
1 STRIP OPHTHALMIC (EYE) ONCE
Refills: 0 | Status: COMPLETED | OUTPATIENT
Start: 2025-02-08 | End: 2025-02-08

## 2025-02-08 RX ORDER — CYCLOPENTOLATE HCL 1 %
1 DROPS OPHTHALMIC (EYE)
Qty: 1 | Refills: 0
Start: 2025-02-08 | End: 2025-02-17

## 2025-02-08 RX ORDER — VANCOMYCIN HYDROCHLORIDE 50 MG/ML
1 KIT ORAL
Refills: 0 | Status: DISCONTINUED | OUTPATIENT
Start: 2025-02-08 | End: 2025-02-11

## 2025-02-08 RX ORDER — TOBRAMYCIN 40 MG/ML
1 INJECTION INTRAMUSCULAR; INTRAVENOUS
Refills: 0 | Status: DISCONTINUED | OUTPATIENT
Start: 2025-02-08 | End: 2025-02-08

## 2025-02-08 RX ORDER — ONDANSETRON 4 MG/1
4 TABLET, ORALLY DISINTEGRATING ORAL ONCE
Refills: 0 | Status: COMPLETED | OUTPATIENT
Start: 2025-02-08 | End: 2025-02-08

## 2025-02-08 RX ORDER — OXYCODONE HYDROCHLORIDE 30 MG/1
5 TABLET ORAL ONCE
Refills: 0 | Status: DISCONTINUED | OUTPATIENT
Start: 2025-02-08 | End: 2025-02-08

## 2025-02-08 RX ORDER — KETOROLAC TROMETHAMINE 10 MG
30 TABLET ORAL ONCE
Refills: 0 | Status: DISCONTINUED | OUTPATIENT
Start: 2025-02-08 | End: 2025-02-08

## 2025-02-08 RX ORDER — TETRACAINE HYDROCHLORIDE 5 MG/ML
1 SOLUTION OPHTHALMIC ONCE
Refills: 0 | Status: COMPLETED | OUTPATIENT
Start: 2025-02-08 | End: 2025-02-08

## 2025-02-08 RX ORDER — ERYTHROMYCIN BASE 5 MG/GRAM
1 OINTMENT (GRAM) OPHTHALMIC (EYE)
Qty: 1 | Refills: 0
Start: 2025-02-08 | End: 2025-02-17

## 2025-02-08 RX ORDER — SODIUM CHLORIDE 5 %
1 OINTMENT (GRAM) OPHTHALMIC (EYE)
Qty: 1 | Refills: 0
Start: 2025-02-08 | End: 2025-02-17

## 2025-02-08 RX ADMIN — Medication 1 DROP(S): at 13:12

## 2025-02-08 RX ADMIN — POLYMYXIN B SULFATE AND TRIMETHOPRIM SULFATE 1 DROP(S): 10000; 1 SOLUTION/ DROPS OPHTHALMIC at 06:57

## 2025-02-08 RX ADMIN — Medication 1 DROP(S): at 15:51

## 2025-02-08 RX ADMIN — VANCOMYCIN HYDROCHLORIDE 1 DROP(S): KIT at 12:14

## 2025-02-08 RX ADMIN — OXYCODONE HYDROCHLORIDE 5 MILLIGRAM(S): 30 TABLET ORAL at 12:30

## 2025-02-08 RX ADMIN — Medication 30 MILLIGRAM(S): at 14:00

## 2025-02-08 RX ADMIN — Medication 1 APPLICATION(S): at 05:58

## 2025-02-08 RX ADMIN — ONDANSETRON 4 MILLIGRAM(S): 4 TABLET, ORALLY DISINTEGRATING ORAL at 09:36

## 2025-02-08 RX ADMIN — OXYCODONE HYDROCHLORIDE 5 MILLIGRAM(S): 30 TABLET ORAL at 11:37

## 2025-02-08 RX ADMIN — TETRACAINE HYDROCHLORIDE 1 DROP(S): 5 SOLUTION OPHTHALMIC at 06:24

## 2025-02-08 RX ADMIN — Medication 30 MILLIGRAM(S): at 13:27

## 2025-02-08 RX ADMIN — VANCOMYCIN HYDROCHLORIDE 1 DROP(S): KIT at 14:55

## 2025-02-08 NOTE — CONSULT NOTE ADULT - SUBJECTIVE AND OBJECTIVE BOX
[facility] DEPARTMENT OF OPHTHALMOLOGY - INITIAL ADULT CONSULT  -----------------------------------------------------------------------------  Lashawn Velásquez MD PGY-2   Contact: TEAMS  -----------------------------------------------------------------------------    HPI:    Interval History: ***    PMH: ***  POcHx: denies surg/laser  FH: denies glc/amd  Social History: denies etoh/tobacco  Ophthalmic Medications: none  Allergies: NKDA    Review of Systems:  Constitutional: No fever, chills  Eyes: No blurry vision, flashes, floaters, FBS, erythema, discharge, double vision, OU  Neuro: No tremors  Cardiovascular: No chest pain, palpitations  Respiratory: No SOB, no cough  GI: No nausea, vomiting, abdominal pain  : No dysuria  Skin: no rash  Psych: no depression  Endocrine: no polyuria, polydipsia  Heme/lymph: no swelling    VITALS: T(C): 36.7 (02-08-25 @ 07:58)  T(F): 98 (02-08-25 @ 07:58), Max: 98 (02-08-25 @ 07:16)  HR: 78 (02-08-25 @ 07:58) (78 - 90)  BP: 143/92 (02-08-25 @ 07:58) (127/89 - 143/92)  RR:  (17 - 18)  SpO2:  (95% - 100%)  Wt(kg): --  General: AAO x 3, appropriate mood and affect    Ophthalmology Exam:  Visual acuity (sc): 20/20 OD, 20/20 OS  Pupils: PERRL OU, no RAPD  Ttono: 16 OD 16 OS  Extraocular movements (EOMs): Full OU, no pain, no diplopia  Confrontational Visual Field (CVF): Full OD, Full OS  Color Plates: 12/12 OD, 12/12 OS    Pen Light Exam (PLE)  External: Flat OU  Lids/Lashes/Lacrimal Ducts: Flat OU    Sclera/Conjunctiva: W+Q OU  Cornea: Cl OU  Anterior Chamber: D+F OU    Iris: Flat OU  Lens: Cl OU    Fundus Exam: dilated with 1% tropicamide and 2.5% phenylephrine  Approval obtained from primary team for dilation  Patient aware that pupils can remained dilated for at least 4-6 hours  Exam performed with 20D lens    Vitreous: wnl OU  Disc, cup/disc: sharp and pink, 0.4 OU  Macula: Flat OU  Vessels: Normal caliber OU  Periphery: flat OU    Labs/Imaging:  *** [facility] DEPARTMENT OF OPHTHALMOLOGY - INITIAL ADULT CONSULT  -----------------------------------------------------------------------------  Lashawn Velásquez MD PGY-2   Contact: TEAMS  -----------------------------------------------------------------------------    HPI:    Interval History: 33 yo male with 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 taking melodie qhs. reports waking in middle of night on 2/8/25 with severe eye pain OD. transfer from [facility]     PMH: as above   POcHx: denies surg/laser  FH: denies glc/amd  Social History: denies etoh/tobacco  Ophthalmic Medications: none  Allergies: NKDA    Review of Systems:  Constitutional: No fever, chills  Eyes: No blurry vision, flashes, floaters, FBS, erythema, discharge, double vision, OU  Neuro: No tremors  Cardiovascular: No chest pain, palpitations  Respiratory: No SOB, no cough  GI: No nausea, vomiting, abdominal pain  : No dysuria  Skin: no rash  Psych: no depression  Endocrine: no polyuria, polydipsia  Heme/lymph: no swelling    VITALS: T(C): 36.7 (02-08-25 @ 07:58)  T(F): 98 (02-08-25 @ 07:58), Max: 98 (02-08-25 @ 07:16)  HR: 78 (02-08-25 @ 07:58) (78 - 90)  BP: 143/92 (02-08-25 @ 07:58) (127/89 - 143/92)  RR:  (17 - 18)  SpO2:  (95% - 100%)  Wt(kg): --  General: AAO x 3, appropriate mood and affect    Ophthalmology Exam:  Visual acuity (sc): 20/20 OD, 20/20 OS  Pupils: PERRL OU, no RAPD  Ttono: 16 OD 16 OS  Extraocular movements (EOMs): Full OU, no pain, no diplopia  Confrontational Visual Field (CVF): Full OD, Full OS  Color Plates: 12/12 OD, 12/12 OS    Pen Light Exam (PLE)  External: Flat OU  Lids/Lashes/Lacrimal Ducts: Flat OU    Sclera/Conjunctiva: W+Q OU  Cornea: Cl OU  Anterior Chamber: D+F OU    Iris: Flat OU  Lens: Cl OU    Fundus Exam: dilated with 1% tropicamide and 2.5% phenylephrine  Approval obtained from primary team for dilation  Patient aware that pupils can remained dilated for at least 4-6 hours  Exam performed with 20D lens    Vitreous: wnl OU  Disc, cup/disc: sharp and pink, 0.4 OU  Macula: Flat OU  Vessels: Normal caliber OU  Periphery: flat OU    Labs/Imaging:  *** [facility] DEPARTMENT OF OPHTHALMOLOGY - INITIAL ADULT CONSULT  -----------------------------------------------------------------------------  Lashawn Velásquez MD PGY-2   Contact: TEAMS  -----------------------------------------------------------------------------    HPI: The patient is a 34-year-old male with a past medical history of multiple corneal abrasions over the last 5–6 years. He presents with a gradual onset of right eye pain since yesterday, complaining now impacting his left eye as well. C/o photosensitivity, eye pressure, tearing, burning sensation, headache, dizziness, and nausea. The patient denies any recent injury, trauma, or fall, but reports having had a viral flu (influenza A) a week ago.    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 taking melodie qhs. reports waking in middle of night on 2/8/25 with severe eye pain OD. transfer from [facility]     PMH: as above   POcHx: denies surg/laser, recurrent corneal erosion as above   FH: denies glc/amd  Social History: denies etoh/tobacco  Ophthalmic Medications: none  Allergies: NKDA    Review of Systems:  Constitutional: No fever, chills  Eyes: (+) blurry vision, photophobia, erythema, FBS OD   Neuro: No tremors  Cardiovascular: No chest pain, palpitations  Respiratory: No SOB, no cough  GI: No nausea, vomiting, abdominal pain  : No dysuria  Skin: no rash  Psych: no depression  Endocrine: no polyuria, polydipsia  Heme/lymph: no swelling    VITALS: T(C): 36.7 (02-08-25 @ 07:58)  T(F): 98 (02-08-25 @ 07:58), Max: 98 (02-08-25 @ 07:16)  HR: 78 (02-08-25 @ 07:58) (78 - 90)  BP: 143/92 (02-08-25 @ 07:58) (127/89 - 143/92)  RR:  (17 - 18)  SpO2:  (95% - 100%)  Wt(kg): --  General: AAO x 3, appropriate mood and affect    Ophthalmology Exam:  Visual acuity (sc): 20/400 PHNI OD, 20/20 OS  Pupils: no APD by reverse   Ttono: 22 OD 24 OS  Extraocular movements (EOMs): Full OU endorses pain   Confrontational Visual Field (CVF): Full OD, Full OS  Color Plates: 12/12 OD, 12/12 OS    Pen Light Exam (PLE)  External: Flat OU  Lids/Lashes/Lacrimal Ducts:  mild upper lid soft swelling OD flat OS    Sclera/Conjunctiva: 3+ injection OD W+Q OS  Cornea: infiltrate/ulceration 4mm x 1 mm nasal with mild thinning, vertical. large abrasion extending nasal to temporal over visual axis OD wnl OS   Anterior Chamber: D+F with fibrin OD quiet OS     Iris: Flat OU  Lens: Cl OU    Fundus Exam: dilated with 1% tropicamide and 2.5% phenylephrine  Approval obtained from primary team for dilation  Patient aware that pupils can remained dilated for at least 4-6 hours  Exam performed with 20D lens    B scan OD without vitreitis/mass/RD     Vitreous: wnl OS  Disc, cup/disc: sharp and pink, 0.4 OS  Macula: Flat OS  Vessels: Normal caliber OS  Periphery: flat OS    Labs/Imaging:  ***

## 2025-02-08 NOTE — ED PROVIDER NOTE - CLINICAL SUMMARY MEDICAL DECISION MAKING FREE TEXT BOX
The patient is a 34-year-old male with a past medical history of multiple corneal abrasions over the last 5–6 years. He presents with a gradual onset of right eye pain since yesterday, complaining now impacting his left eye as well. C/o photosensitivity, eye pressure, tearing, burning sensation, headache, dizziness, and nausea. The patient denies any recent injury, trauma, or fall, but reports having had a viral flu (influenza A) a week ago.  PT transferred from [facility] for further optho eval.   antiemetic  Optho consult- final dispo by opto team

## 2025-02-08 NOTE — ED PROVIDER NOTE - PATIENT PORTAL LINK FT
You can access the FollowMyHealth Patient Portal offered by  by registering at the following website: [email or web address]. By joining [employer]’s FollowMyHealth portal, you will also be able to view your health information using other applications (apps) compatible with our system.

## 2025-02-08 NOTE — ED ADULT NURSE NOTE - OBJECTIVE STATEMENT
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

## 2025-02-08 NOTE — ED ADULT NURSE NOTE - CHIEF COMPLAINT QUOTE
transfer from [facility] for r corneal ulceration. c/o pain r  eye. received percocet at [facility]. c/o pain  r eye

## 2025-02-08 NOTE — CONSULT NOTE ADULT - ASSESSMENT
Assessment and Recommendations:  34y male with a past medical history/ocular history of *** consulted for ***, found to have ***    Seen and discussed with ***.    Outpatient Follow-up: Patient should follow-up with his/her ophthalmologist or with [facility] Department of Ophthalmology within 1 week of after discharge at:    [address]  [place] [address]  [phone]    Lashawn Velásquez MD, PGY-2  Contact: [employer]     Assessment and Recommendations:  34y male with a past medical history/ocular history of *** consulted for ***, found to have ***      #corneal ulcer OD   - fortified vanc and tobra a2h while awake   -     Seen and discussed with ***.    Outpatient Follow-up: Patient should follow-up with his/her ophthalmologist or with [facility] Department of Ophthalmology within 1 week of after discharge at:    [address]  [place] [address]  [phone]    Lashawn Velásquez MD, PGY-2  Contact: [employer] Teams     Assessment and Recommendations:  34y male with a past medical history/ocular history of recurrent corneal erosion OU consulted for pain photophobia OD, found to have corneal ulcer OD      #corneal ulcer OD   - fortified vanc and tobra q2h   - cyclogyl tid OD   - erythromycin ointment qhs OD   - melodie ointment (home med) OS QHS   - pt will be followed outpatient tomorrow     DW Dr. Camacho     Outpatient Follow-up: Patient should follow-up with his/her ophthalmologist or with [facility] Department of Ophthalmology within 1 week of after discharge at:    [address]  [place] [address]  [phone]    Lashawn Velásquez MD, PGY-2  Contact: [employer] Teams     Assessment and Recommendations:  34y male with a past medical history/ocular history of recurrent corneal erosion OU consulted for pain photophobia OD, found to have corneal ulcer OD      #corneal ulcer OD   - pt with 1 day  hx of severe eye pain iso prior hx of recurrent corneal erosions starting in 2019. Pt uses melodie qhs OU, reports symptoms OD > OS and that he will have about 4-5 episodes per year    - Va 20/400 OD PHNI, IOP 22, no apd by reverse, color plates, eom and cvf full  - anterior exam OD with: 2+ injection,  infiltrate/ulceration 4mm x 1 mm nasal with mild thinning, vertical. large abrasion extending nasal to temporal over visual axis, ac with fibrin, no hypopyon   - DFE OS WNL    - B scan OS no tears holes detachments RD's   - fortified vanc and tobra q2h   - cyclogyl tid OD   - erythromycin ointment qhs OD   - melodie ointment (home med) OS QHS   - pt will be followed outpatient tomorrow     DW Dr. Camacho     Outpatient Follow-up: Patient should follow-up with his/her ophthalmologist or with [facility] Department of Ophthalmology within 1 week of after discharge at:    [address]  [place] [address]  [phone]    Lashawn Velásquez MD, PGY-2  Contact: [employer] Teams     Assessment and Recommendations:  34y male with a past medical history/ocular history of recurrent corneal erosion OU consulted for pain photophobia OD, found to have corneal ulcer OD      #corneal ulcer OD   - pt with 1 day  hx of severe eye pain iso prior hx of recurrent corneal erosions starting in 2019. Pt uses melodie qhs OU, reports symptoms OD > OS and that he will have about 4-5 episodes per year    - Va 20/400 OD PHNI, IOP 22, no apd by reverse, color plates, eom and cvf full  - anterior exam OD with: 2+ injection,  infiltrate/ulceration 4mm x 1 mm nasal with mild thinning, vertical. large abrasion extending nasal to temporal over visual axis, ac with fibrin, no hypopyon   - DFE OS WNL    - B scan OS no tears holes detachments RD's   - culture sent- no organisms   - fortified vanc and tobra q2h   - cyclogyl tid OD   - erythromycin ointment qhs OD   - melodie ointment (home med) OS QHS   - pt will be followed outpatient tomorrow     DW Dr. Camacho     Outpatient Follow-up: Patient should follow-up with his/her ophthalmologist or with [facility] Department of Ophthalmology within 1 week of after discharge at:    [address]  [place] [address]  [phone]    Lashawn Velásquez MD, PGY-2  Contact: [employer] Teams

## 2025-02-08 NOTE — ED PROVIDER NOTE - NSFOLLOWUPINSTRUCTIONS_ED_ALL_ED_FT
PLEASE FOLLOW UP   [address]  [place] [address]  [phone] PLEASE FOLLOW UP   [address]  [place] [address]  [phone]    Take your medication as it prescribed to you.    fortified vanc and tobra  every 2 hours  - cyclogyl  three times a day  right eye   - erythromycin ointment at bedtime right eye   - melodie ointment (home med) Left eye at bedtime    followed outpatient tomorrow       Corneal Ulcer    A corneal ulcer is an open sore on the cornea. The cornea is the clear covering at the front and center of the eye. Corneal ulcers can affect your vision and may cause permanent damage if they are not treated.    What are the causes?  This condition may be caused by:    An infection. This is the most common cause. The infection may be from:    Bacteria.  Virus.  Fungus.  Parasite.    Foreign bodies in the eye, such as sand, glass, or small pieces of metal.  Dry eye syndrome.  Certain conditions that prevent eyelids from closing completely, such as Bell palsy.  An eye injury.  Contact lenses.    What increases the risk?  The following factors may make you more likely to develop this condition:    Wearing your contact lenses for too long or not taking care of them properly.  Having a weakened disease-fighting (immune) system.  Having a history of cold sores, chicken pox, or shingles.  Using steroid eye drops.    What are the signs or symptoms?  Symptoms of this condition include:    Eye pain. The pain is often severe.  Blurry vision.  Sensitivity to light.  Pus or thick discharge coming from your eye.  Eye redness.  Feeling like something is in your eye.  Watery or itchy eye.  Burning or stinging feeling.    When ulcers get large, they may be seen as a white spot on the cornea.    How is this diagnosed?  This condition is diagnosed based on your symptoms and medical history as well as an eye exam. Your health care provider may use a type of microscope (slit lamp) to examine the cornea. Eye drops may be put into the eye to make the ulcer easier to see.    Tissue samples or cultures from the eye may be done to see if an infection is causing the corneal ulcer. Numbing eye drops will be given before any samples or cultures are taken. The samples or cultures will be checked in the lab for bacteria, viruses, fungi, or parasites.    How is this treated?  Treatment for this condition depends on the cause. If your ulcer is severe, you may be given antibiotic eye drops until your health care provider knows the test results. Other treatments may include:    Antibiotic medicines by mouth, or medicines in the form of ointments or eye drops, to treat infections caused by bacteria, viruses, parasites, or funguses.  Over-the-counter or prescription pain medicine.  Steroid eye drops if the eye is inflamed and swollen.  An injection of medicine under the thin membrane covering the eyeball (conjunctiva). This allows medicine to reach the ulcer in high doses.  Removing the foreign body that caused the eye injury.  Artificial tears or a bandage contact lens if severe dry eyes caused the corneal ulcer.  Eye patching to reduce irritation from blinking and bright light.    If the corneal ulcer causes a scar on the cornea that interferes with vision, surgery may be needed to replace the cornea (corneal transplant).    Follow these instructions at home:  Medicines     Take or apply any over-the-counter and prescription medicines only as told by your health care provider.  If you were prescribed an antibiotic medicine, including pills, eye drops, or ointment, use it as told by your health care provider. Do not stop using the antibiotic even if your condition improves.    You may have to apply eye drops every few minutes to every hour for several days.  It may be necessary to set your alarm clock every few minutes to every hour during the night.    Use artificial tears as needed if you have dry eyes.  Lifestyle     Avoid wearing eye makeup.  Avoid bright lights. Use sunglasses if you have light sensitivity.  Do not wear contact lenses until your health care provider approves.  General instructions     Do not touch or rub your eye. This may increase the irritation and spread the infection.  If directed, wear your eye patch as told.  Do not drive or operate heavy machinery until your health care provider approves.  Apply cool packs to your eye to relieve discomfort and swelling.  If you have an infection, wash your hands often with soap and water. If soap and water are not available, use hand .  Keep all follow-up visits as told by your health care provider. This is important.  How is this prevented?  If you normally wear contact lenses:    Do not wear contact lenses while you sleep.  Wash your hands before removing contact lenses.  Properly sterilize and store your contact lenses.  Regularly clean your contact lens case.  Do not use your saliva or tap water to clean or wet your contact lenses.  Remove your contact lenses if your eye becomes irritated. You may put them back in once your eyes feel better.    Contact a health care provider if:  Your pain gets worse.  You have more discharge coming from your eye.  Get help right away if:  You have a change in vision.  Your eyelids or the skin around them is red or swollen.  Summary  A corneal ulcer is an open sore on the cornea.  Severe eye pain is a common symptom of a corneal ulcer.  Treatment for a corneal ulcer depends on the cause. It may include antibacterial, antiviral, or antifungal eye drops or ointment.

## 2025-02-08 NOTE — ED PROVIDER NOTE - ATTENDING APP SHARED VISIT CONTRIBUTION OF CARE
Attending note:   After face to face evaluation of this patient, I concur with above noted hx, pe, and care plan for this patient. Mckeon: 34-year-old male with history of multiple corneal abrasions.  Patient presented to the ED earlier yesterday with right eye pain for few days.  Patient complaining of light sensitivity pressure and tearing.  Patient also had mild headache.  Patient was seen at outside hospital and transferred for ophthalmology evaluation.  Patient's vital signs show minimally elevated blood pressure.  Patient decreased visual acuity in the right eye and injection is noted.  As per ophthalmology evaluation there was fluorescein uptake with ulceration.  No periorbital edema or erythema is noted.  Patient ordered for multiple drops and awaiting reevaluation by ophthalmology.

## 2025-02-08 NOTE — ED ADULT NURSE NOTE - CAS ELECT INFOMATION PROVIDED
needs.      DC instructions

## 2025-02-08 NOTE — ED PROVIDER NOTE - NSICDXPASTSURGICALHX_GEN_ALL_CORE_FT
Please see attached initial consultation letter. PAST SURGICAL HISTORY:  No significant past surgical history

## 2025-02-08 NOTE — ED ADULT TRIAGE NOTE - CHIEF COMPLAINT QUOTE
transfer from [facility] for r corneal ulceration. c/o pain r  eye. received percocet at [facility]. c/o pain  r eye

## 2025-02-08 NOTE — ED PROVIDER NOTE - OBJECTIVE STATEMENT
The patient is a 34-year-old male with a past medical history of multiple corneal abrasions over the last 5–6 years. He presents with a gradual onset of right eye pain since yesterday, complaining now impacting his left eye as well. C/o photosensitivity, eye pressure, tearing, burning sensation, headache, dizziness, and nausea. The patient denies any recent injury, trauma, or fall, but reports having had a viral flu (influenza A) a week ago.

## 2025-02-09 LAB — GRAM STN FLD: ABNORMAL

## 2025-02-09 NOTE — CHART NOTE - NSCHARTNOTEFT_GEN_A_CORE
Patient reexamined today in clinic 2/9/25.   Endorses some improvement in pain.       #Corneal ulcer, OD   - with hx of recurrent corneal erosions OU   - Now with corneal ulcer OD   - VA today 20/400 ph 20/150 OD  - Exam with 6x2mm epithelial defect centrally and then extending inferiorly on nasal side. Infiltrate encompasses nasal portion of epithelial defect. Also with 0.5mm hypopyon  - recommend vanc and tobra q1hr while awake, erythro nightly   - patient to fu in clinic tomorrow     Outpatient follow-up: Patient should follow-up with his/her ophthalmologist or with [facility] Department of Ophthalmology upon discharge at the address below     [facility] Department of Ophthalmology  [address] [place] [address]. [address]  [place] [address]  [phone]

## 2025-02-10 ENCOUNTER — NON-APPOINTMENT (OUTPATIENT)
Age: 35
End: 2025-02-10

## 2025-02-10 ENCOUNTER — APPOINTMENT (OUTPATIENT)
Dept: OPHTHALMOLOGY | Facility: CLINIC | Age: 35
End: 2025-02-10
Payer: COMMERCIAL

## 2025-02-10 LAB
-  CLINDAMYCIN: SIGNIFICANT CHANGE UP
-  ERYTHROMYCIN: SIGNIFICANT CHANGE UP
-  GENTAMICIN: SIGNIFICANT CHANGE UP
-  OXACILLIN: SIGNIFICANT CHANGE UP
-  PENICILLIN: SIGNIFICANT CHANGE UP
-  RIFAMPIN: SIGNIFICANT CHANGE UP
-  TETRACYCLINE: SIGNIFICANT CHANGE UP
-  TRIMETHOPRIM/SULFAMETHOXAZOLE: SIGNIFICANT CHANGE UP
-  VANCOMYCIN: SIGNIFICANT CHANGE UP
METHOD TYPE: SIGNIFICANT CHANGE UP

## 2025-02-10 PROCEDURE — 92004 COMPRE OPH EXAM NEW PT 1/>: CPT

## 2025-02-10 PROCEDURE — 92285 EXTERNAL OCULAR PHOTOGRAPHY: CPT

## 2025-02-10 PROCEDURE — 76512 OPH US DX B-SCAN: CPT | Mod: RT

## 2025-02-11 ENCOUNTER — APPOINTMENT (OUTPATIENT)
Dept: OPHTHALMOLOGY | Facility: CLINIC | Age: 35
End: 2025-02-11
Payer: COMMERCIAL

## 2025-02-11 PROCEDURE — 92012 INTRM OPH EXAM EST PATIENT: CPT

## 2025-02-12 ENCOUNTER — NON-APPOINTMENT (OUTPATIENT)
Age: 35
End: 2025-02-12

## 2025-02-12 ENCOUNTER — APPOINTMENT (OUTPATIENT)
Dept: OPHTHALMOLOGY | Facility: CLINIC | Age: 35
End: 2025-02-12
Payer: COMMERCIAL

## 2025-02-12 PROCEDURE — 92002 INTRM OPH EXAM NEW PATIENT: CPT

## 2025-02-13 ENCOUNTER — APPOINTMENT (OUTPATIENT)
Dept: OPHTHALMOLOGY | Facility: CLINIC | Age: 35
End: 2025-02-13
Payer: COMMERCIAL

## 2025-02-13 ENCOUNTER — NON-APPOINTMENT (OUTPATIENT)
Age: 35
End: 2025-02-13

## 2025-02-13 LAB
CULTURE RESULTS: ABNORMAL
ORGANISM # SPEC MICROSCOPIC CNT: ABNORMAL
ORGANISM # SPEC MICROSCOPIC CNT: ABNORMAL
SPECIMEN SOURCE: SIGNIFICANT CHANGE UP

## 2025-02-13 PROCEDURE — 92012 INTRM OPH EXAM EST PATIENT: CPT

## 2025-02-14 ENCOUNTER — APPOINTMENT (OUTPATIENT)
Dept: OPHTHALMOLOGY | Facility: CLINIC | Age: 35
End: 2025-02-14
Payer: COMMERCIAL

## 2025-02-14 ENCOUNTER — NON-APPOINTMENT (OUTPATIENT)
Age: 35
End: 2025-02-14

## 2025-02-14 PROCEDURE — 92012 INTRM OPH EXAM EST PATIENT: CPT

## 2025-02-18 ENCOUNTER — NON-APPOINTMENT (OUTPATIENT)
Age: 35
End: 2025-02-18

## 2025-02-18 ENCOUNTER — APPOINTMENT (OUTPATIENT)
Dept: OPHTHALMOLOGY | Facility: CLINIC | Age: 35
End: 2025-02-18
Payer: COMMERCIAL

## 2025-02-18 PROCEDURE — 92012 INTRM OPH EXAM EST PATIENT: CPT

## 2025-02-21 ENCOUNTER — NON-APPOINTMENT (OUTPATIENT)
Age: 35
End: 2025-02-21

## 2025-02-21 ENCOUNTER — APPOINTMENT (OUTPATIENT)
Dept: OPHTHALMOLOGY | Facility: CLINIC | Age: 35
End: 2025-02-21
Payer: COMMERCIAL

## 2025-02-21 PROCEDURE — 92012 INTRM OPH EXAM EST PATIENT: CPT

## 2025-02-25 ENCOUNTER — APPOINTMENT (OUTPATIENT)
Dept: OPHTHALMOLOGY | Facility: CLINIC | Age: 35
End: 2025-02-25
Payer: COMMERCIAL

## 2025-02-25 ENCOUNTER — NON-APPOINTMENT (OUTPATIENT)
Age: 35
End: 2025-02-25

## 2025-02-25 PROCEDURE — 92012 INTRM OPH EXAM EST PATIENT: CPT

## 2025-03-04 ENCOUNTER — NON-APPOINTMENT (OUTPATIENT)
Age: 35
End: 2025-03-04

## 2025-03-04 ENCOUNTER — APPOINTMENT (OUTPATIENT)
Dept: OPHTHALMOLOGY | Facility: CLINIC | Age: 35
End: 2025-03-04
Payer: COMMERCIAL

## 2025-03-04 PROCEDURE — 92012 INTRM OPH EXAM EST PATIENT: CPT

## 2025-03-17 ENCOUNTER — NON-APPOINTMENT (OUTPATIENT)
Age: 35
End: 2025-03-17

## 2025-03-17 ENCOUNTER — APPOINTMENT (OUTPATIENT)
Dept: OPHTHALMOLOGY | Facility: CLINIC | Age: 35
End: 2025-03-17
Payer: COMMERCIAL

## 2025-03-17 PROCEDURE — 92012 INTRM OPH EXAM EST PATIENT: CPT

## 2025-04-30 ENCOUNTER — NON-APPOINTMENT (OUTPATIENT)
Age: 35
End: 2025-04-30

## 2025-04-30 ENCOUNTER — APPOINTMENT (OUTPATIENT)
Dept: OPHTHALMOLOGY | Facility: CLINIC | Age: 35
End: 2025-04-30
Payer: COMMERCIAL

## 2025-04-30 PROCEDURE — 92012 INTRM OPH EXAM EST PATIENT: CPT

## 2025-06-20 ENCOUNTER — APPOINTMENT (OUTPATIENT)
Dept: OPHTHALMOLOGY | Facility: CLINIC | Age: 35
End: 2025-06-20

## 2025-07-11 NOTE — ED PROVIDER NOTE - PROGRESS NOTE DETAILS
Rx for resupply sent to [employer] via for; to (do).  
TONEY Portillo- improved, discussed great details ophto recommendations

## 2025-07-21 ENCOUNTER — APPOINTMENT (OUTPATIENT)
Dept: OPHTHALMOLOGY | Facility: CLINIC | Age: 35
End: 2025-07-21